# Patient Record
Sex: FEMALE | Race: WHITE | Employment: OTHER | URBAN - NONMETROPOLITAN AREA
[De-identification: names, ages, dates, MRNs, and addresses within clinical notes are randomized per-mention and may not be internally consistent; named-entity substitution may affect disease eponyms.]

---

## 2017-03-03 ENCOUNTER — HOSPITAL ENCOUNTER (OUTPATIENT)
Dept: GENERAL RADIOLOGY | Age: 72
Discharge: HOME | End: 2017-03-03
Payer: MEDICARE

## 2017-03-03 DIAGNOSIS — M25.542 ARTHRALGIA OF LEFT HAND: ICD-10-CM

## 2017-03-03 PROCEDURE — 73130 X-RAY EXAM OF HAND: CPT

## 2017-03-15 ENCOUNTER — HOSPITAL ENCOUNTER (OUTPATIENT)
Dept: NEUROLOGY | Age: 72
Discharge: HOME | End: 2017-03-15
Payer: MEDICARE

## 2017-03-15 PROCEDURE — 95886 MUSC TEST DONE W/N TEST COMP: CPT

## 2017-03-15 PROCEDURE — 95911 NRV CNDJ TEST 9-10 STUDIES: CPT

## 2017-03-15 PROCEDURE — 95886 MUSC TEST DONE W/N TEST COMP: CPT | Performed by: PSYCHIATRY & NEUROLOGY

## 2017-03-15 PROCEDURE — 95911 NRV CNDJ TEST 9-10 STUDIES: CPT | Performed by: PSYCHIATRY & NEUROLOGY

## 2017-04-12 ENCOUNTER — HOSPITAL ENCOUNTER (OUTPATIENT)
Dept: GENERAL RADIOLOGY | Facility: HOSPITAL | Age: 72
Discharge: HOME | End: 2017-04-12
Admitting: ANESTHESIOLOGY

## 2017-04-12 ENCOUNTER — APPOINTMENT (OUTPATIENT)
Dept: PREADMISSION TESTING | Facility: HOSPITAL | Age: 72
End: 2017-04-12

## 2017-04-12 VITALS
DIASTOLIC BLOOD PRESSURE: 65 MMHG | HEART RATE: 67 BPM | OXYGEN SATURATION: 90 % | BODY MASS INDEX: 18.94 KG/M2 | WEIGHT: 125 LBS | HEIGHT: 68 IN | SYSTOLIC BLOOD PRESSURE: 134 MMHG | RESPIRATION RATE: 20 BRPM

## 2017-04-12 LAB
ANION GAP SERPL CALCULATED.3IONS-SCNC: 9 MMOL/L (ref 4–13)
BUN BLD-MCNC: 12 MG/DL (ref 5–21)
BUN/CREAT SERPL: 15 (ref 7–25)
CALCIUM SPEC-SCNC: 9.7 MG/DL (ref 8.4–10.4)
CHLORIDE SERPL-SCNC: 99 MMOL/L (ref 98–110)
CO2 SERPL-SCNC: 35 MMOL/L (ref 24–31)
CREAT BLD-MCNC: 0.8 MG/DL (ref 0.5–1.4)
DEPRECATED RDW RBC AUTO: 46.5 FL (ref 40–54)
ERYTHROCYTE [DISTWIDTH] IN BLOOD BY AUTOMATED COUNT: 12.6 % (ref 12–15)
GFR SERPL CREATININE-BSD FRML MDRD: 71 ML/MIN/1.73
GLUCOSE BLD-MCNC: 78 MG/DL (ref 70–100)
HCT VFR BLD AUTO: 49.7 % (ref 37–47)
HGB BLD-MCNC: 16.3 G/DL (ref 12–16)
MCH RBC QN AUTO: 33.1 PG (ref 28–32)
MCHC RBC AUTO-ENTMCNC: 32.8 G/DL (ref 33–36)
MCV RBC AUTO: 101 FL (ref 82–98)
PLATELET # BLD AUTO: 137 10*3/MM3 (ref 130–400)
PMV BLD AUTO: 11.8 FL (ref 6–12)
POTASSIUM BLD-SCNC: 3.9 MMOL/L (ref 3.5–5.3)
RBC # BLD AUTO: 4.92 10*6/MM3 (ref 4.2–5.4)
SODIUM BLD-SCNC: 143 MMOL/L (ref 135–145)
WBC NRBC COR # BLD: 5.96 10*3/MM3 (ref 4.8–10.8)

## 2017-04-12 PROCEDURE — 93010 ELECTROCARDIOGRAM REPORT: CPT | Performed by: INTERNAL MEDICINE

## 2017-04-12 PROCEDURE — 85027 COMPLETE CBC AUTOMATED: CPT

## 2017-04-12 PROCEDURE — 71010 HC CHEST PA OR AP: CPT

## 2017-04-12 PROCEDURE — 93005 ELECTROCARDIOGRAM TRACING: CPT

## 2017-04-12 PROCEDURE — 80048 BASIC METABOLIC PNL TOTAL CA: CPT

## 2017-04-12 RX ORDER — CLORAZEPATE DIPOTASSIUM 7.5 MG/1
7.5 TABLET ORAL 2 TIMES DAILY PRN
COMMUNITY

## 2017-04-12 RX ORDER — LISINOPRIL 5 MG/1
5 TABLET ORAL DAILY
COMMUNITY

## 2017-04-12 RX ORDER — LEVALBUTEROL INHALATION SOLUTION 1.25 MG/3ML
1 SOLUTION RESPIRATORY (INHALATION) 2 TIMES DAILY
COMMUNITY
End: 2019-06-01 | Stop reason: SDUPTHER

## 2017-04-12 RX ORDER — LEVOTHYROXINE SODIUM 0.1 MG/1
100 TABLET ORAL DAILY
COMMUNITY

## 2017-04-12 RX ORDER — LEVOCETIRIZINE DIHYDROCHLORIDE 5 MG/1
5 TABLET, FILM COATED ORAL EVERY EVENING
COMMUNITY

## 2017-04-12 RX ORDER — MONTELUKAST SODIUM 10 MG/1
10 TABLET ORAL NIGHTLY
COMMUNITY

## 2017-04-12 RX ORDER — MELATONIN
1000 DAILY
COMMUNITY

## 2017-04-12 RX ORDER — VERAPAMIL HYDROCHLORIDE 240 MG/1
240 TABLET, FILM COATED, EXTENDED RELEASE ORAL NIGHTLY
COMMUNITY

## 2017-04-12 RX ORDER — AZELASTINE 1 MG/ML
2 SPRAY, METERED NASAL 2 TIMES DAILY PRN
COMMUNITY

## 2017-04-12 NOTE — DISCHARGE INSTRUCTIONS
DAY OF SURGERY INSTRUCTIONS        YOUR SURGEON: dr thomas hermosillo    PROCEDURE: ulnar nerve transposition left     DATE OF SURGERY: 4/19/2017    ARRIVAL TIME: AS DIRECTED BY OFFICE    DAY OF SURGERY TAKE ONLY THESE MEDICATIONS: ***to use inhalers        BEFORE YOU COME TO THE HOSPITAL  (Pre-op instructions)  • Do not eat, drink, smoke or chew gum after midnight the night before surgery.  This also includes no mints.  • Morning of surgery take only the medicines you have been instructed with a sip of water unless otherwise instructed  by your physician.  • Do not shave, wear makeup or dark nail polish.  • Remove all jewelry including rings.  • Leave anything you consider valuable at home.  • Leave your suitcase in the car until after your surgery.  • Bring the following with you if applicable:  o Picture ID and insurance, Medicare or Medicaid cards  o Co-pay/deductible required by insurance (cash, check, credit card)  o Medications (no narcotics) in original bottles (not a list) including all over-the-counter medications.  o Copy of advance directive, living will or power-of- documents if not brought to PAT  o CPAP or BIPAP mask and tubing  o Skin prep instruction sheet  o Relaxation aids (MP3 player, book, magazine)  • Confirm your arrival time with you surgeon the day before your surgery (surgery times are subject to change)  • On the day of surgery check in at registration located at the main entrance of the hospital.       Outpatient Surgery Guidelines, Adult  Outpatient procedures are those for which the person having the procedure is allowed to go home the same day as the procedure. Various procedures are done on an outpatient basis. You should follow some general guidelines if you will be having an outpatient procedure.  LET YOUR HEALTH CARE PROVIDER KNOW ABOUT:  · Any allergies you have.  · All medicines you are taking, including vitamins, herbs, eye drops, creams, and over-the-counter  medicines.  · Previous problems you or members of your family have had with the use of anesthetics.  · Any blood disorders you have.  · Previous surgeries you have had.  · Medical conditions you have.  RISKS AND COMPLICATIONS  Your health care provider will discuss possible risks and complications with you before surgery. Common risks and complications include:    · Problems due to the use of anesthetics.  · Blood loss and replacement (does not apply to minor surgical procedures).  · Temporary increase in pain due to surgery.  · Uncorrected pain or problems that the surgery was meant to correct.  · Infection.  · New damage.  BEFORE THE PROCEDURE  · Ask your health care provider about changing or stopping your regular medicines. You may need to stop taking certain medicines in the days or weeks before the procedure.  · Stop smoking at least 2 weeks before surgery. This lowers your risk for complications during and after surgery. Ask your health care provider for help with this if needed.  · Eat your usual meals and a light supper the day before surgery. Continue fluid intake. Do not drink alcohol.  · Do not eat or drink after midnight the night before your surgery.   · Arrange for someone to take you home and to stay with you for 24 hours after the procedure. Medicine given for your procedure may affect your ability to drive or to care for yourself.  · Call your health care provider's office if you develop an illness or problem that may prevent you from safely having your procedure.  AFTER THE PROCEDURE  After surgery, you will be taken to a recovery area, where your progress will be monitored. If there are no complications, you will be allowed to go home when you are awake, stable, and taking fluids well. You may have numbness around the surgical site. Healing will take some time. You will have tenderness at the surgical site and may have some swelling and bruising. You may also have some nausea.  HOME CARE  INSTRUCTIONS  · Do not drive for 24 hours, or as directed by your health care provider. Do not drive while taking prescription pain medicines.  · Do not drink alcohol for 24 hours.  · Do not make important decisions or sign legal documents for 24 hours.  · You may resume a normal diet and activities as directed.  · Do not lift anything heavier than 10 pounds (4.5 kg) or play contact sports until your health care provider says it is okay.  · Change your bandages (dressings) as directed.  · Only take over-the-counter or prescription medicines as directed by your health care provider.  · Follow up with your health care provider as directed.  SEEK MEDICAL CARE IF:  · You have increased bleeding (more than a small spot) from the surgical site.  · You have redness, swelling, or increasing pain in the wound.  · You see pus coming from the wound.  · You have a fever.  · You notice a bad smell coming from the wound or dressing.  · You feel lightheaded or faint.  · You develop a rash.  · You have trouble breathing.  · You develop allergies.  MAKE SURE YOU:  · Understand these instructions.  · Will watch your condition.  · Will get help right away if you are not doing well or get worse.     This information is not intended to replace advice given to you by your health care provider. Make sure you discuss any questions you have with your health care provider.     Document Released: 09/12/2002 Document Revised: 05/03/2016 Document Reviewed: 05/22/2014  [employer] Interactive Patient Education ©2016 [employer]       Fall Prevention in Hospitals, Adult  As a hospital patient, your condition and the treatments you receive can increase your risk for falls. Some additional risk factors for falls in a hospital include:  · Being in an unfamiliar environment.  · Being on bed rest.  · Your surgery.  · Taking certain medicines.  · Your tubing requirements, such as intravenous (IV) therapy or catheters.  It is important that you learn how  to decrease fall risks while at the hospital. Below are important tips that can help prevent falls.  SAFETY TIPS FOR PREVENTING FALLS  Talk about your risk of falling.  · Ask your health care provider why you are at risk for falling. Is it your medicine, illness, tubing placement, or something else?  · Make a plan with your health care provider to keep you safe from falls.  · Ask your health care provider or pharmacist about side effects of your medicines. Some medicines can make you dizzy or affect your coordination.  Ask for help.  · Ask for help before getting out of bed. You may need to press your call button.  · Ask for assistance in getting safely to the toilet.  · Ask for a walker or cane to be put at your bedside. Ask that most of the side rails on your bed be placed up before your health care provider leaves the room.  · Ask family or friends to sit with you.  · Ask for things that are out of your reach, such as your glasses, hearing aids, telephone, bedside table, or call button.  Follow these tips to avoid falling:  · Stay lying or seated, rather than standing, while waiting for help.  · Wear rubber-soled slippers or shoes whenever you walk in the hospital.  · Avoid quick, sudden movements.  ¨ Change positions slowly.  ¨ Sit on the side of your bed before standing.  ¨ Stand up slowly and wait before you start to walk.  · Let your health care provider know if there is a spill on the floor.  · Pay careful attention to the medical equipment, electrical cords, and tubes around you.  · When you need help, use your call button by your bed or in the bathroom. Wait for one of your health care providers to help you.  · If you feel dizzy or unsure of your footing, return to bed and wait for assistance.  · Avoid being distracted by the TV, telephone, or another person in your room.  · Do not lean or support yourself on rolling objects, such as IV poles or bedside tables.     This information is not intended to  replace advice given to you by your health care provider. Make sure you discuss any questions you have with your health care provider.     Document Released: 12/15/2001 Document Revised: 01/08/2016 Document Reviewed: 08/25/2013  [employer] Interactive Patient Education ©2016 [employer]       Surgical Site Infections FAQs  What is a Surgical Site Infection (SSI)?  A surgical site infection is an infection that occurs after surgery in the part of the body where the surgery took place. Most patients who have surgery do not develop an infection. However, infections develop in about 1 to 3 out of every 100 patients who have surgery.  Some of the common symptoms of a surgical site infection are:  · Redness and pain around the area where you had surgery  · Drainage of cloudy fluid from your surgical wound  · Fever  Can SSIs be treated?  Yes. Most surgical site infections can be treated with antibiotics. The antibiotic given to you depends on the bacteria (germs) causing the infection. Sometimes patients with SSIs also need another surgery to treat the infection.  What are some of the things that hospitals are doing to prevent SSIs?  To prevent SSIs, doctors, nurses, and other healthcare providers:  · Clean their hands and arms up to their elbows with an antiseptic agent just before the surgery.  · Clean their hands with soap and water or an alcohol-based hand rub before and after caring for each patient.  · May remove some of your hair immediately before your surgery using electric clippers if the hair is in the same area where the procedure will occur. They should not shave you with a razor.  · Wear special hair covers, masks, gowns, and gloves during surgery to keep the surgery area clean.  · Give you antibiotics before your surgery starts. In most cases, you should get antibiotics within 60 minutes before the surgery starts and the antibiotics should be stopped within 24 hours after surgery.  · Clean the skin at the  site of your surgery with a special soap that kills germs.  What can I do to help prevent SSIs?  Before your surgery:  · Tell your doctor about other medical problems you may have. Health problems such as allergies, diabetes, and obesity could affect your surgery and your treatment.  · Quit smoking. Patients who smoke get more infections. Talk to your doctor about how you can quit before your surgery.  · Do not shave near where you will have surgery. Shaving with a razor can irritate your skin and make it easier to develop an infection.  At the time of your surgery:  · Speak up if someone tries to shave you with a razor before surgery. Ask why you need to be shaved and talk with your surgeon if you have any concerns.  · Ask if you will get antibiotics before surgery.  After your surgery:  · Make sure that your healthcare providers clean their hands before examining you, either with soap and water or an alcohol-based hand rub.  · If you do not see your providers clean their hands, please ask them to do so.  · Family and friends who visit you should not touch the surgical wound or dressings.  · Family and friends should clean their hands with soap and water or an alcohol-based hand rub before and after visiting you. If you do not see them clean their hands, ask them to clean their hands.  What do I need to do when I go home from the hospital?  · Before you go home, your doctor or nurse should explain everything you need to know about taking care of your wound. Make sure you understand how to care for your wound before you leave the hospital.  · Always clean your hands before and after caring for your wound.  · Before you go home, make sure you know who to contact if you have questions or problems after you get home.  · If you have any symptoms of an infection, such as redness and pain at the surgery site, drainage, or fever, call your doctor immediately.  If you have additional questions, please ask your doctor or  nurse.  Developed and co-sponsored by The Society for Healthcare Epidemiology of [place] (SHEA); Infectious Diseases Society [place] (IDSA); American Hospital Association; Association for Professionals in Infection Control and Epidemiology (APIC); Centers for Disease Control and Prevention (CDC); and The Joint Commission.     This information is not intended to replace advice given to you by your health care provider. Make sure you discuss any questions you have with your health care provider.     Document Released: 12/23/2014 Document Revised: 01/08/2016 Document Reviewed: 03/02/2016  [employer] Interactive Patient Education ©2016 [employer]       [facility]  CHG 4% Patient Instruction Sheet    Preparing the Skin Before Surgery  Preparing or “prepping” skin before surgery can reduce the risk of infection at the surgical site. To make the process easier,[facility] has chosen 4% Chlorhexidine Gluconate (CHG) antiseptic solution.   The steps below outline the prepping process and should be carefully followed.                                                                                                                                                      Prep the skin at the following time(s):                                                      We recommend you shower the night before surgery, and again the morning of surgery with the 4% CHG antiseptic solution using half of the bottle and a cloth each time.  Dress in clean clothes/sleepwear after showering.  See instructions below for application.          Do not apply any lotions or moisturizers.       Do not shave the area to be prepped for at least 2 days prior to surgery.    Clipping the hair may be done immediately prior to your surgery at the hospital    if needed.    Directions:  Thoroughly rinse your body with water.  Apply 4% CHG to a cloth and wash skin gently, paying special attention to the operative site.  Rinse again thoroughly.  Once you  have begun using this product do not apply anything else to your skin. If itching or redness persists, rinse affected areas and discontinue use.    When using this product:  • Keep out of eyes, ears, and mouth.  • If solution should contact these areas, rinse out promptly and thoroughly with water.  • For external use only.  • Do not use in genital area, on your face or head.      PATIENT/FAMILY/RESPONSIBLE PARTY VERBALIZES UNDERSTANDING OF ABOVE EDUCATION

## 2017-04-19 ENCOUNTER — ANESTHESIA EVENT (OUTPATIENT)
Dept: PERIOP | Facility: HOSPITAL | Age: 72
End: 2017-04-19

## 2017-04-19 ENCOUNTER — HOSPITAL ENCOUNTER (OUTPATIENT)
Facility: HOSPITAL | Age: 72
Setting detail: HOSPITAL OUTPATIENT SURGERY
Discharge: HOME | End: 2017-04-19
Attending: ORTHOPAEDIC SURGERY | Admitting: ORTHOPAEDIC SURGERY

## 2017-04-19 ENCOUNTER — ANESTHESIA (OUTPATIENT)
Dept: PERIOP | Facility: HOSPITAL | Age: 72
End: 2017-04-19

## 2017-04-19 VITALS
HEART RATE: 59 BPM | OXYGEN SATURATION: 94 % | TEMPERATURE: 98.5 F | SYSTOLIC BLOOD PRESSURE: 93 MMHG | RESPIRATION RATE: 16 BRPM | DIASTOLIC BLOOD PRESSURE: 73 MMHG

## 2017-04-19 PROCEDURE — 25010000002 ONDANSETRON PER 1 MG: Performed by: ANESTHESIOLOGY

## 2017-04-19 RX ORDER — SODIUM CHLORIDE 0.9 % (FLUSH) 0.9 %
1-10 SYRINGE (ML) INJECTION AS NEEDED
Status: DISCONTINUED | OUTPATIENT
Start: 2017-04-19 | End: 2017-04-19 | Stop reason: HOSPADM

## 2017-04-19 RX ORDER — METOCLOPRAMIDE HYDROCHLORIDE 5 MG/ML
5 INJECTION INTRAMUSCULAR; INTRAVENOUS
Status: DISCONTINUED | OUTPATIENT
Start: 2017-04-19 | End: 2017-04-19 | Stop reason: HOSPADM

## 2017-04-19 RX ORDER — SODIUM CHLORIDE, SODIUM LACTATE, POTASSIUM CHLORIDE, CALCIUM CHLORIDE 600; 310; 30; 20 MG/100ML; MG/100ML; MG/100ML; MG/100ML
100 INJECTION, SOLUTION INTRAVENOUS CONTINUOUS
Status: DISCONTINUED | OUTPATIENT
Start: 2017-04-19 | End: 2017-04-19 | Stop reason: HOSPADM

## 2017-04-19 RX ORDER — IPRATROPIUM BROMIDE AND ALBUTEROL SULFATE 2.5; .5 MG/3ML; MG/3ML
3 SOLUTION RESPIRATORY (INHALATION) ONCE AS NEEDED
Status: DISCONTINUED | OUTPATIENT
Start: 2017-04-19 | End: 2017-04-19 | Stop reason: HOSPADM

## 2017-04-19 RX ORDER — FLUMAZENIL 0.1 MG/ML
0.2 INJECTION INTRAVENOUS AS NEEDED
Status: DISCONTINUED | OUTPATIENT
Start: 2017-04-19 | End: 2017-04-19 | Stop reason: HOSPADM

## 2017-04-19 RX ORDER — LABETALOL HYDROCHLORIDE 5 MG/ML
5 INJECTION, SOLUTION INTRAVENOUS
Status: DISCONTINUED | OUTPATIENT
Start: 2017-04-19 | End: 2017-04-19 | Stop reason: HOSPADM

## 2017-04-19 RX ORDER — ONDANSETRON 2 MG/ML
4 INJECTION INTRAMUSCULAR; INTRAVENOUS AS NEEDED
Status: DISCONTINUED | OUTPATIENT
Start: 2017-04-19 | End: 2017-04-19 | Stop reason: HOSPADM

## 2017-04-19 RX ORDER — IPRATROPIUM BROMIDE AND ALBUTEROL SULFATE 2.5; .5 MG/3ML; MG/3ML
3 SOLUTION RESPIRATORY (INHALATION) DAILY
Status: COMPLETED | OUTPATIENT
Start: 2017-04-19 | End: 2017-04-19

## 2017-04-19 RX ORDER — MIDAZOLAM HYDROCHLORIDE 1 MG/ML
2 INJECTION INTRAMUSCULAR; INTRAVENOUS
Status: DISCONTINUED | OUTPATIENT
Start: 2017-04-19 | End: 2017-04-19 | Stop reason: HOSPADM

## 2017-04-19 RX ORDER — MORPHINE SULFATE 2 MG/ML
2 INJECTION, SOLUTION INTRAMUSCULAR; INTRAVENOUS AS NEEDED
Status: DISCONTINUED | OUTPATIENT
Start: 2017-04-19 | End: 2017-04-19 | Stop reason: HOSPADM

## 2017-04-19 RX ORDER — PHENYLEPHRINE HCL IN 0.9% NACL 0.8MG/10ML
SYRINGE (ML) INTRAVENOUS AS NEEDED
Status: DISCONTINUED | OUTPATIENT
Start: 2017-04-19 | End: 2017-04-19 | Stop reason: SURG

## 2017-04-19 RX ORDER — NALOXONE HCL 0.4 MG/ML
0.04 VIAL (ML) INJECTION AS NEEDED
Status: DISCONTINUED | OUTPATIENT
Start: 2017-04-19 | End: 2017-04-19 | Stop reason: HOSPADM

## 2017-04-19 RX ORDER — SUFENTANIL CITRATE 50 UG/ML
INJECTION EPIDURAL; INTRAVENOUS AS NEEDED
Status: DISCONTINUED | OUTPATIENT
Start: 2017-04-19 | End: 2017-04-19 | Stop reason: SURG

## 2017-04-19 RX ORDER — MIDAZOLAM HYDROCHLORIDE 1 MG/ML
1 INJECTION INTRAMUSCULAR; INTRAVENOUS
Status: DISCONTINUED | OUTPATIENT
Start: 2017-04-19 | End: 2017-04-19 | Stop reason: HOSPADM

## 2017-04-19 RX ORDER — OXYCODONE AND ACETAMINOPHEN 7.5; 325 MG/1; MG/1
1 TABLET ORAL EVERY 6 HOURS PRN
Qty: 40 TABLET | Refills: 0
Start: 2017-04-19 | End: 2017-04-29

## 2017-04-19 RX ORDER — HYDRALAZINE HYDROCHLORIDE 20 MG/ML
5 INJECTION INTRAMUSCULAR; INTRAVENOUS
Status: DISCONTINUED | OUTPATIENT
Start: 2017-04-19 | End: 2017-04-19 | Stop reason: HOSPADM

## 2017-04-19 RX ORDER — MAGNESIUM HYDROXIDE 1200 MG/15ML
LIQUID ORAL AS NEEDED
Status: DISCONTINUED | OUTPATIENT
Start: 2017-04-19 | End: 2017-04-19 | Stop reason: HOSPADM

## 2017-04-19 RX ORDER — MEPERIDINE HYDROCHLORIDE 25 MG/ML
12.5 INJECTION INTRAMUSCULAR; INTRAVENOUS; SUBCUTANEOUS
Status: DISCONTINUED | OUTPATIENT
Start: 2017-04-19 | End: 2017-04-19 | Stop reason: HOSPADM

## 2017-04-19 RX ADMIN — SODIUM CHLORIDE, POTASSIUM CHLORIDE, SODIUM LACTATE AND CALCIUM CHLORIDE 100 ML/HR: 600; 310; 30; 20 INJECTION, SOLUTION INTRAVENOUS at 06:39

## 2017-04-19 RX ADMIN — IPRATROPIUM BROMIDE AND ALBUTEROL SULFATE 3 ML: .5; 3 SOLUTION RESPIRATORY (INHALATION) at 07:34

## 2017-04-19 RX ADMIN — LIDOCAINE HYDROCHLORIDE 0.5 ML: 10 INJECTION, SOLUTION EPIDURAL; INFILTRATION; INTRACAUDAL; PERINEURAL at 06:39

## 2017-04-19 RX ADMIN — SUFENTANIL CITRATE 20 MCG: 50 INJECTION, SOLUTION EPIDURAL; INTRAVENOUS at 07:54

## 2017-04-19 RX ADMIN — Medication 800 MCG: at 08:00

## 2017-04-19 RX ADMIN — ONDANSETRON 4 MG: 2 INJECTION INTRAMUSCULAR; INTRAVENOUS at 12:39

## 2017-04-19 NOTE — OP NOTE
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     PROCEDURE PERFORMED: Anterior transposition of the left ulnar nerve.     SURGEON:  Narendra Timmons MD     INDICATIONS FOR PROCEDURE: This patient has developed a severe entrapment of the ulnar nerve with significant intrinsic atrophy of the musculature of her hand. It was felt that an anterior transposition of ulnar nerve was indicated. The patient understands the risk of incomplete return of function, due to the severe nature of her atrophy. She accepts that risk and asked me to proceed.     DESCRIPTION OF PROCEDURE: After an adequate level of general anesthesia, the patient's left upper extremity was prepped and draped in the usual fashion. The extremity was then exsanguinated with an Esmarch bandage and the tourniquet was inflated to 250 mmHg.  A curvilinear incision was then made over the prominence of the medial epicondyle. Using meticulous blunt dissection, the ulnar nerve was then identified proximally and then dissected free from the cubital tunnel. Any bleeders were controlled with the bipolar Bovie. When the nerve was sufficiently released proximally and distally, attention was placed to the medial intermuscular septum. This was released off the medial epicondyle down to the medial aspect of the humerus. A portion of the conjoined tendon was then released off of the medial epicondyle distal to the insertion of the medial epicondyle to create an appropriate bed for the nerve to lie in unrestricted. The area was then irrigated and suctioned dry. The cubital tunnel was then closed with Vicryl suture. The subcutaneous tissue was then sewn to the medial epicondyle so as to restrict of the ulnar nerve from subluxing. Care was taken not to put any undue tension on the repair. The skin was then closed with  nylon suture. A dressing was then applied. The patient tolerated the procedure well and is to follow up in the office.         cc:             WAQAS WRIGHT/[id number]  D:  04/19/2017 10:25:00(Eastern Time)  T:  04/19/2017 12:16:32(Eastern Time)  Voice ID:  [id number]/Document ID:  [id number]

## 2017-04-19 NOTE — ANESTHESIA PROCEDURE NOTES
Airway  Urgency: elective    Date/Time: 4/19/2017 7:47 AM  Airway not difficult    General Information and Staff    Patient location during procedure: OR  CRNA: EVE HER    Indications and Patient Condition  Indications for airway management: airway protection    Preoxygenated: yes  MILS maintained throughout  Mask difficulty assessment: 1 - vent by mask    Final Airway Details  Final airway type: supraglottic airway      Successful airway: Ley[place]  Size 3  Airway Seal Pressure (cm H2O): 15    Number of attempts at approach: 1

## 2017-04-19 NOTE — NURSING NOTE
Percocet order was put in [facility] by Dr Timmons but did not print. Nurse in PACU (Siomara) notified Dr Timmons that it would not print. Dr Timmons hand wrote a script for Norco for patient to go home with. Called and spoke with Siomara in PACU to verify correct medication for patient to go home with

## 2017-04-19 NOTE — DISCHARGE INSTRUCTIONS

## 2017-04-19 NOTE — PLAN OF CARE
Problem: Patient Care Overview (Adult)  Goal: Plan of Care Review    04/19/17 1444   Coping/Psychosocial Response Interventions   Plan Of Care Reviewed With significant other   Patient Care Overview   Progress improving   Outcome Evaluation   Outcome Summary/Follow up Plan pt unable to keep sats above 90% with O2. able to go home with baseline sats of 85% or above per md orders. doing well non symptomatic          Problem: Perioperative Period (Adult)  Goal: Signs and Symptoms of Listed Potential Problems Will be Absent or Manageable (Perioperative Period)  Outcome: Outcome(s) achieved Date Met:  04/19/17 04/19/17 1440   Perioperative Period   Problems Assessed (Perioperative Period) all;pain;other (see comments)  (baseline is 85% went home at 94%)   Problems Present (Perioperative Period) none;pain;other (see comments)  (pt 02 sats low and is to go home with 02 with 85% sats per )

## 2017-04-19 NOTE — ANESTHESIA PREPROCEDURE EVALUATION
" Anesthesia Evaluation     NPO Status: > 8 hours   Airway   Mallampati: II  TM distance: >3 FB  Neck ROM: full  no difficulty expected  Dental    (+) implants and lower dentures    Pulmonary     breath sounds clear to auscultation  (+) COPD severe, asthma, sleep apnea, decreased breath sounds,     ROS comment: On home O2  Cardiovascular - normal exam  Exercise tolerance: poor (<4 METS)    Rhythm: regular  Rate: normal    (+) hypertension well controlled,       Neuro/Psych  (+) psychiatric history Anxiety,    GI/Hepatic/Renal/Endo - negative ROS     Musculoskeletal     Abdominal  - normal exam    Abdomen: soft.   Substance History - negative use     OB/GYN negative ob/gyn ROS         Other   (+) arthritis       Other Comment: States that she gets \"real mean and quits breathing when administered N2O in the dentist's office.                            Anesthesia Plan    ASA 3     general     intravenous induction   Anesthetic plan and risks discussed with patient.    Plan discussed with CRNA.      "

## 2017-04-19 NOTE — ANESTHESIA POSTPROCEDURE EVALUATION
Patient: Julya Jose    Procedure Summary     Date Anesthesia Start Anesthesia Stop Room / Location    04/19/17 0754 0917  [facility] OR 11 / [facility] OR       Procedure Diagnosis Surgeon Provider    ULNAR NERVE TRANSPOSITION, LEFT (Left Elbow) (G56.22, LEFT) MD Pepe Landa CRNA          Anesthesia Type: general  Last vitals  /67 (04/19/17 1010)    Temp      Pulse 55 (04/19/17 1010)   Resp 11 (04/19/17 1010)    SpO2 91 % (04/19/17 1010)      Post Anesthesia Care and Evaluation    Patient location during evaluation: PACU  Patient participation: complete - patient participated  Level of consciousness: awake and alert  Pain management: adequate  Airway patency: patent  Anesthetic complications: No anesthetic complications    Cardiovascular status: acceptable and hemodynamically stable  Respiratory status: acceptable  Hydration status: acceptable    Comments: )Pt with COPD on home o2

## 2017-06-12 RX ORDER — LEVOTHYROXINE SODIUM 0.1 MG/1
100 TABLET ORAL DAILY
Qty: 30 TABLET | Refills: 3 | Status: SHIPPED | OUTPATIENT
Start: 2017-06-12 | End: 2017-06-16 | Stop reason: SDUPTHER

## 2017-06-16 RX ORDER — LEVOTHYROXINE SODIUM 0.1 MG/1
TABLET ORAL
Qty: 30 TABLET | Refills: 2 | OUTPATIENT
Start: 2017-06-16

## 2017-06-16 RX ORDER — LEVOTHYROXINE SODIUM 0.1 MG/1
100 TABLET ORAL DAILY
Qty: 30 TABLET | Refills: 3 | Status: SHIPPED | OUTPATIENT
Start: 2017-06-16 | End: 2017-07-12 | Stop reason: SDUPTHER

## 2017-07-06 RX ORDER — MONTELUKAST SODIUM 10 MG/1
TABLET ORAL
Qty: 90 TABLET | Refills: 2 | Status: SHIPPED | OUTPATIENT
Start: 2017-07-06 | End: 2017-10-30 | Stop reason: SDUPTHER

## 2017-07-11 ENCOUNTER — TELEPHONE (OUTPATIENT)
Dept: INTERNAL MEDICINE | Age: 72
End: 2017-07-11

## 2017-07-12 RX ORDER — LEVOTHYROXINE SODIUM 0.1 MG/1
100 TABLET ORAL DAILY
Qty: 90 TABLET | Refills: 3 | Status: SHIPPED | OUTPATIENT
Start: 2017-07-12 | End: 2017-10-30 | Stop reason: SDUPTHER

## 2017-09-01 RX ORDER — LEVOCETIRIZINE DIHYDROCHLORIDE 5 MG/1
TABLET, FILM COATED ORAL
Qty: 90 TABLET | Refills: 2 | Status: SHIPPED | OUTPATIENT
Start: 2017-09-01 | End: 2017-10-30 | Stop reason: SDUPTHER

## 2017-09-08 DIAGNOSIS — Z01.89 ROUTINE LAB DRAW: ICD-10-CM

## 2017-10-02 RX ORDER — VERAPAMIL HYDROCHLORIDE 240 MG/1
TABLET, FILM COATED, EXTENDED RELEASE ORAL
Qty: 90 TABLET | Refills: 3 | Status: SHIPPED | OUTPATIENT
Start: 2017-10-02 | End: 2017-10-30 | Stop reason: SDUPTHER

## 2017-10-13 RX ORDER — CLORAZEPATE DIPOTASSIUM 7.5 MG/1
TABLET ORAL
Qty: 60 TABLET | Refills: 0 | Status: SHIPPED | OUTPATIENT
Start: 2017-10-13 | End: 2017-10-30 | Stop reason: SDUPTHER

## 2017-10-24 ENCOUNTER — TELEPHONE (OUTPATIENT)
Dept: INTERNAL MEDICINE | Age: 72
End: 2017-10-24

## 2017-10-24 NOTE — TELEPHONE ENCOUNTER
Patient called and left  on 10/23/2017 at 11:22am  She gave her name, birthday and phone number but by the time she got all of that out, she said   \"oh hell forget it\"    Unknown reason for her call. Called patient back today to see what she needed. Shes going to get her labs done at [address] [facility] these orders this date.    Fax [phone]

## 2017-10-30 ENCOUNTER — OFFICE VISIT (OUTPATIENT)
Dept: INTERNAL MEDICINE | Age: 72
End: 2017-10-30
Payer: MEDICARE

## 2017-10-30 VITALS
HEIGHT: 68 IN | SYSTOLIC BLOOD PRESSURE: 134 MMHG | BODY MASS INDEX: 18.79 KG/M2 | DIASTOLIC BLOOD PRESSURE: 74 MMHG | WEIGHT: 124 LBS | HEART RATE: 67 BPM | OXYGEN SATURATION: 84 %

## 2017-10-30 DIAGNOSIS — E03.9 HYPOTHYROIDISM, UNSPECIFIED TYPE: ICD-10-CM

## 2017-10-30 DIAGNOSIS — F41.9 ANXIETY: ICD-10-CM

## 2017-10-30 DIAGNOSIS — I10 HYPERTENSION, UNSPECIFIED TYPE: Primary | ICD-10-CM

## 2017-10-30 DIAGNOSIS — Z91.09 ENVIRONMENTAL ALLERGIES: ICD-10-CM

## 2017-10-30 DIAGNOSIS — J44.9 CHRONIC OBSTRUCTIVE PULMONARY DISEASE, UNSPECIFIED COPD TYPE (HCC): ICD-10-CM

## 2017-10-30 PROCEDURE — 4040F PNEUMOC VAC/ADMIN/RCVD: CPT | Performed by: INTERNAL MEDICINE

## 2017-10-30 PROCEDURE — 1090F PRES/ABSN URINE INCON ASSESS: CPT | Performed by: INTERNAL MEDICINE

## 2017-10-30 PROCEDURE — 3017F COLORECTAL CA SCREEN DOC REV: CPT | Performed by: INTERNAL MEDICINE

## 2017-10-30 PROCEDURE — 4004F PT TOBACCO SCREEN RCVD TLK: CPT | Performed by: INTERNAL MEDICINE

## 2017-10-30 PROCEDURE — G8427 DOCREV CUR MEDS BY ELIG CLIN: HCPCS | Performed by: INTERNAL MEDICINE

## 2017-10-30 PROCEDURE — 99214 OFFICE O/P EST MOD 30 MIN: CPT | Performed by: INTERNAL MEDICINE

## 2017-10-30 PROCEDURE — 3023F SPIROM DOC REV: CPT | Performed by: INTERNAL MEDICINE

## 2017-10-30 PROCEDURE — G8484 FLU IMMUNIZE NO ADMIN: HCPCS | Performed by: INTERNAL MEDICINE

## 2017-10-30 PROCEDURE — 3014F SCREEN MAMMO DOC REV: CPT | Performed by: INTERNAL MEDICINE

## 2017-10-30 PROCEDURE — G8400 PT W/DXA NO RESULTS DOC: HCPCS | Performed by: INTERNAL MEDICINE

## 2017-10-30 PROCEDURE — G8926 SPIRO NO PERF OR DOC: HCPCS | Performed by: INTERNAL MEDICINE

## 2017-10-30 PROCEDURE — G8420 CALC BMI NORM PARAMETERS: HCPCS | Performed by: INTERNAL MEDICINE

## 2017-10-30 PROCEDURE — 1123F ACP DISCUSS/DSCN MKR DOCD: CPT | Performed by: INTERNAL MEDICINE

## 2017-10-30 RX ORDER — NEOMYCIN SULFATE, POLYMYXIN B SULFATE, HYDROCORTISONE 3.5; 10000; 1 MG/ML; [USP'U]/ML; MG/ML
1 SOLUTION/ DROPS AURICULAR (OTIC) EVERY 8 HOURS SCHEDULED
Qty: 10 ML | Refills: 1 | Status: SHIPPED | OUTPATIENT
Start: 2017-10-30

## 2017-10-30 RX ORDER — CLORAZEPATE DIPOTASSIUM 7.5 MG/1
TABLET ORAL
Qty: 60 TABLET | Refills: 0 | Status: SHIPPED | OUTPATIENT
Start: 2017-10-30 | End: 2017-11-04 | Stop reason: SDUPTHER

## 2017-10-30 RX ORDER — LEVOTHYROXINE SODIUM 0.1 MG/1
100 TABLET ORAL DAILY
Qty: 90 TABLET | Refills: 3 | Status: SHIPPED | OUTPATIENT
Start: 2017-10-30

## 2017-10-30 RX ORDER — BUSPIRONE HYDROCHLORIDE 5 MG/1
5 TABLET ORAL 3 TIMES DAILY PRN
Qty: 30 TABLET | Refills: 0 | Status: ON HOLD | OUTPATIENT
Start: 2017-10-30 | End: 2017-12-19

## 2017-10-30 RX ORDER — LISINOPRIL 5 MG/1
TABLET ORAL
Qty: 90 TABLET | Refills: 3 | Status: SHIPPED | OUTPATIENT
Start: 2017-10-30

## 2017-10-30 RX ORDER — MONTELUKAST SODIUM 10 MG/1
TABLET ORAL
Qty: 90 TABLET | Refills: 2 | Status: SHIPPED | OUTPATIENT
Start: 2017-10-30

## 2017-10-30 RX ORDER — VERAPAMIL HYDROCHLORIDE 240 MG/1
TABLET, FILM COATED, EXTENDED RELEASE ORAL
Qty: 90 TABLET | Refills: 3 | Status: SHIPPED | OUTPATIENT
Start: 2017-10-30

## 2017-10-30 RX ORDER — LEVOCETIRIZINE DIHYDROCHLORIDE 5 MG/1
5 TABLET, FILM COATED ORAL NIGHTLY
Qty: 90 TABLET | Refills: 2 | Status: ON HOLD | OUTPATIENT
Start: 2017-10-30 | End: 2017-12-19

## 2017-10-30 ASSESSMENT — PATIENT HEALTH QUESTIONNAIRE - PHQ9
SUM OF ALL RESPONSES TO PHQ9 QUESTIONS 1 & 2: 0
2. FEELING DOWN, DEPRESSED OR HOPELESS: 0
SUM OF ALL RESPONSES TO PHQ QUESTIONS 1-9: 0
1. LITTLE INTEREST OR PLEASURE IN DOING THINGS: 0

## 2017-10-30 NOTE — PATIENT INSTRUCTIONS
Chronic Obstructive Pulmonary Disease (COPD): Care Instructions  Your Care Instructions    Chronic obstructive pulmonary disease (COPD) is a general term for a group of lung diseases, including emphysema and chronic bronchitis. People with COPD have decreased airflow in and out of the lungs, which makes it hard to breathe. The airways also can get clogged with thick mucus. Cigarette smoking is a major cause of COPD. Although there is no cure for COPD, you can slow its progress. Following your treatment plan and taking care of yourself can help you feel better and live longer. Follow-up care is a key part of your treatment and safety. Be sure to make and go to all appointments, and call your doctor if you are having problems. It's also a good idea to know your test results and keep a list of the medicines you take. How can you care for yourself at home? Staying healthy  · Do not smoke. This is the most important step you can take to prevent more damage to your lungs. If you need help quitting, talk to your doctor about stop-smoking programs and medicines. These can increase your chances of quitting for good. · Avoid colds and flu. Get a pneumococcal vaccine shot. If you have had one before, ask your doctor whether you need a second dose. Get the flu vaccine every fall. If you must be around people with colds or the flu, wash your hands often. · Avoid secondhand smoke, air pollution, and high altitudes. Also avoid cold, dry air and hot, humid air. Stay at home with your windows closed when air pollution is bad. Medicines and oxygen therapy  · Take your medicines exactly as prescribed. Call your doctor if you think you are having a problem with your medicine. · You may be taking medicines such as:  ¨ Bronchodilators. These help open your airways and make breathing easier. Bronchodilators are either short-acting (work for 6 to 9 hours) or long-acting (work for 24 hours).  You inhale most bronchodilators, so they start to act quickly. Always carry your quick-relief inhaler with you in case you need it while you are away from home. ¨ Corticosteroids (prednisone, budesonide). These reduce airway inflammation. They come in pill or inhaled form. You must take these medicines every day for them to work well. · A spacer may help you get more inhaled medicine to your lungs. Ask your doctor or pharmacist if a spacer is right for you. If it is, ask how to use it properly. · Do not take any vitamins, over-the-counter medicine, or herbal products without talking to your doctor first.  · If your doctor prescribed antibiotics, take them as directed. Do not stop taking them just because you feel better. You need to take the full course of antibiotics. · Oxygen therapy boosts the amount of oxygen in your blood and helps you breathe easier. Use the flow rate your doctor has recommended, and do not change it without talking to your doctor first.  Activity  · Get regular exercise. Walking is an easy way to get exercise. Start out slowly, and walk a little more each day. · Pay attention to your breathing. You are exercising too hard if you cannot talk while you are exercising. · Take short rest breaks when doing household chores and other activities. · Learn breathing methods--such as breathing through pursed lips--to help you become less short of breath. · If your doctor has not set you up with a pulmonary rehabilitation program, talk to him or her about whether rehab is right for you. Rehab includes exercise programs, education about your disease and how to manage it, help with diet and other changes, and emotional support. Diet  · Eat regular, healthy meals. Use bronchodilators about 1 hour before you eat to make it easier to eat. Eat several small meals instead of three large ones. Drink beverages at the end of the meal. Avoid foods that are hard to chew.   · Eat foods that contain protein so that you do not lose muscle

## 2017-11-06 RX ORDER — CLORAZEPATE DIPOTASSIUM 7.5 MG/1
TABLET ORAL
Qty: 60 TABLET | Refills: 2 | Status: SHIPPED | OUTPATIENT
Start: 2017-11-06 | End: 2017-11-14 | Stop reason: SDUPTHER

## 2017-11-07 ASSESSMENT — ENCOUNTER SYMPTOMS
BLOOD IN STOOL: 0
SORE THROAT: 0
NAUSEA: 0
EYE PAIN: 0
EYE REDNESS: 0
PHOTOPHOBIA: 0
RECTAL PAIN: 0
EYE DISCHARGE: 0
VOICE CHANGE: 0
EYE ITCHING: 0
TROUBLE SWALLOWING: 0
CONSTIPATION: 0
ABDOMINAL DISTENTION: 0
COLOR CHANGE: 0
RHINORRHEA: 0
BACK PAIN: 0
SINUS PAIN: 0
ABDOMINAL PAIN: 0
DIARRHEA: 0
VOMITING: 0
ANAL BLEEDING: 0
SINUS PRESSURE: 0

## 2017-11-07 NOTE — PROGRESS NOTES
Chief Complaint   Patient presents with    6 Month Follow-Up    Anxiety    Hypothyroidism       HPI: Patricia Julian is a 67 y.o. female is here for Follow-up of hypertension, hypothyroidism and anxiety. Her blood pressure is well controlled. She denies complaints of chest pain, chest pressure or shortness of breath. She occasionally has some chest tightness secondary to history of COPD, but at this time she is not having any issues. She did see Dr. nAne Gilbert recently for her COPD. She states that her breathing varies depending on the weather. She did have some shortness of breath here a few weeks ago when she was painting her house, but this has really resolved. She is on oxygen at night. She does think that the nocturnal O2 does help with her memory. She feels much better since she started oxygen at night. She declines a low-dose CT scan to evaluate for lung cancer. Her TSH is currently stable. She does use clorazepate at night to relax and get some sleep. She would like to try something that is not a narcotic so she will not have to pick the medication up. She would like a prescription for Clorazepate to have on hand, but she would like to try something else for anxiety. She has no major concerns or complaints today. Past Medical History:   Diagnosis Date    Anxiety     Asthma     Hypertension     Hypothyroidism     Irritable bowel syndrome       Past Surgical History:   Procedure Laterality Date    HYSTERECTOMY, VAGINAL      PROCTOPEXY      abdominal approach    SUBTOTAL COLECTOMY        Social History     Social History    Marital status:       Spouse name: N/A    Number of children: N/A    Years of education: N/A     Social History Main Topics    Smoking status: Current Every Day Smoker     Packs/day: 1.00     Types: Cigarettes    Smokeless tobacco: Never Used    Alcohol use No    Drug use: No    Sexual activity: Not Currently     Other Topics Concern    None     Social polyuria. Genitourinary: Negative for difficulty urinating, dysuria, flank pain, frequency and hematuria. Musculoskeletal: Negative for arthralgias, back pain, gait problem, joint swelling, myalgias, neck pain and neck stiffness. Skin: Negative for color change, pallor, rash and wound. Allergic/Immunologic: Positive for environmental allergies. Negative for food allergies and immunocompromised state. Neurological: Negative for dizziness, tremors, seizures, syncope, facial asymmetry, speech difficulty, weakness, light-headedness, numbness and headaches. Hematological: Negative for adenopathy. Does not bruise/bleed easily. Psychiatric/Behavioral: Negative for dysphoric mood. The patient is nervous/anxious. She does have some anxiety issues that time       /74   Pulse 67   Ht 5' 8\" (1.727 m)   Wt 124 lb (56.2 kg)   SpO2 (!) 84%   BMI 18.85 kg/m²   Physical Exam   Constitutional: She is oriented to person, place, and time. She appears well-developed and well-nourished. No distress. HENT:   Head: Normocephalic and atraumatic. Right Ear: External ear normal.   Left Ear: External ear normal.   Nose: Nose normal.   Mouth/Throat: Oropharynx is clear and moist. No oropharyngeal exudate. Eyes: Conjunctivae and EOM are normal. Pupils are equal, round, and reactive to light. Right eye exhibits no discharge. Left eye exhibits no discharge. No scleral icterus. Neck: Normal range of motion. Neck supple. No JVD present. No tracheal deviation present. No thyromegaly present. Cardiovascular: Normal rate, regular rhythm, normal heart sounds and intact distal pulses. Exam reveals no gallop and no friction rub. No murmur heard. Pulmonary/Chest: Effort normal and breath sounds normal. No respiratory distress. She has no wheezes. She has no rales. She exhibits no tenderness. Abdominal: Soft. Bowel sounds are normal. She exhibits no distension and no mass. There is no tenderness.  There is no rebound and no guarding. Musculoskeletal: Normal range of motion. She exhibits no edema, tenderness or deformity. Lymphadenopathy:     She has no cervical adenopathy. Neurological: She is alert and oriented to person, place, and time. She has normal reflexes. She displays normal reflexes. No cranial nerve deficit. She exhibits normal muscle tone. Coordination normal.   Skin: Skin is warm and dry. No rash noted. She is not diaphoretic. No erythema. No pallor. Psychiatric: She has a normal mood and affect. Her behavior is normal. Judgment and thought content normal.   Nursing note and vitals reviewed. 1. Hypertension, unspecified type        ASSESSMENT/PLAN:    20-year-old woman here for follow-up of hypertension    1. Hypertension: Blood pressure well controlled with current regimen    2. Hypothyroidism: Stable on her current dose of levothyroxine    3. Environmental allergies: Continue Singulair    4. COPD stable    5. Anxiety: We can continue clorazepate as needed. She would like to try BuSpar which is not a narcotic to see if she can prevent to have didn't come to [place] so many times to  her control prescription    6. She's not having any issues at this time, she would like a prescription of eardrops to have available should she develop ear pain. Eardrops were prescribed today    Mac Sarah was seen today for 6 month follow-up, anxiety and hypothyroidism. Diagnoses and all orders for this visit:    Hypertension, unspecified type  -     CBC Auto Differential; Future  -     Comprehensive Metabolic Panel; Future  -     Lipid Panel; Future  -     TSH without Reflex; Future    Other orders  -     Discontinue: clorazepate (TRANXENE) 7.5 MG tablet; TAKE 1 TABLET BY MOUTH TWICE A DAY AS NEEDED  -     levocetirizine (XYZAL) 5 MG tablet; Take 1 tablet by mouth nightly  -     levothyroxine (LEVOTHROID) 100 MCG tablet;  Take 1 tablet by mouth daily  -     lisinopril (PRINIVIL;ZESTRIL) 5 MG tablet; TAKE 1 TABLET BY MOUTH EVERY DAY  -     montelukast (SINGULAIR) 10 MG tablet; TAKE 1 TABLET EVERY DAY  -     neomycin-polymyxin-hydrocortisone 1 % SOLN otic solution; Place 1 drop into both ears every 8 hours  -     verapamil (CALAN SR) 240 MG extended release tablet; TAKE 1 TABLET EVERY DAY  -     busPIRone (BUSPAR) 5 MG tablet;  Take 1 tablet by mouth 3 times daily as needed (anxiety)          Return in about 6 months (around 4/30/2018) for physical.     Orders Placed This Encounter   Procedures    CBC Auto Differential     Fast 12 hours     Standing Status:   Future     Standing Expiration Date:   10/30/2018    Comprehensive Metabolic Panel     Fasting 12 hours     Standing Status:   Future     Standing Expiration Date:   10/30/2018    Lipid Panel     Standing Status:   Future     Standing Expiration Date:   10/30/2018     Order Specific Question:   Is Patient Fasting?/# of Hours     Answer:   12    TSH without Reflex     Fast 12 hours     Standing Status:   Future     Standing Expiration Date:   10/30/2018       Jordon Gilmore MD

## 2017-11-09 ENCOUNTER — TELEPHONE (OUTPATIENT)
Dept: INTERNAL MEDICINE | Age: 72
End: 2017-11-09

## 2017-11-10 ENCOUNTER — TELEPHONE (OUTPATIENT)
Dept: INTERNAL MEDICINE | Age: 72
End: 2017-11-10

## 2017-11-14 RX ORDER — CLORAZEPATE DIPOTASSIUM 7.5 MG/1
TABLET ORAL
Qty: 60 TABLET | Refills: 2 | Status: SHIPPED | OUTPATIENT
Start: 2017-11-14 | End: 2017-11-16 | Stop reason: SDUPTHER

## 2017-11-15 ENCOUNTER — TELEPHONE (OUTPATIENT)
Dept: INTERNAL MEDICINE | Age: 72
End: 2017-11-15

## 2017-11-16 RX ORDER — CLORAZEPATE DIPOTASSIUM 3.75 MG/1
TABLET ORAL
Qty: 60 TABLET | Refills: 1 | Status: SHIPPED | OUTPATIENT
Start: 2017-11-16 | End: 2017-11-17 | Stop reason: SDUPTHER

## 2017-11-16 NOTE — TELEPHONE ENCOUNTER
Yes the pharmacy told her they are not making the 7.5 they make the 3.75 and she is wanting this states the buspar does not help her at all [facility] and would like us to call it in if we can due to living in [facility]

## 2017-11-17 RX ORDER — CLORAZEPATE DIPOTASSIUM 3.75 MG/1
TABLET ORAL
Qty: 60 TABLET | Refills: 1 | Status: CANCELLED | OUTPATIENT
Start: 2017-11-17

## 2017-11-17 RX ORDER — CLORAZEPATE DIPOTASSIUM 3.75 MG/1
TABLET ORAL
Qty: 60 TABLET | Refills: 2 | Status: SHIPPED | OUTPATIENT
Start: 2017-11-17 | End: 2017-11-17 | Stop reason: ALTCHOICE

## 2017-11-17 RX ORDER — CLORAZEPATE DIPOTASSIUM 3.75 MG/1
3.75 TABLET ORAL 2 TIMES DAILY
Qty: 60 TABLET | Refills: 2 | Status: SHIPPED | OUTPATIENT
Start: 2017-11-17 | End: 2018-02-20 | Stop reason: SDUPTHER

## 2017-12-19 ENCOUNTER — HOSPITAL ENCOUNTER (INPATIENT)
Age: 72
LOS: 3 days | Discharge: HOME HEALTH | DRG: 189 | End: 2017-12-22
Attending: INTERNAL MEDICINE | Admitting: INTERNAL MEDICINE
Payer: MEDICARE

## 2017-12-19 PROCEDURE — 2700000000 HC OXYGEN THERAPY PER DAY

## 2017-12-19 PROCEDURE — 1210000000 HC MED SURG R&B

## 2017-12-19 PROCEDURE — 94660 CPAP INITIATION&MGMT: CPT

## 2017-12-20 PROBLEM — J96.22 ACUTE ON CHRONIC RESPIRATORY FAILURE WITH HYPERCAPNIA (HCC): Status: ACTIVE | Noted: 2017-12-20

## 2017-12-20 PROBLEM — J44.1 COPD EXACERBATION (HCC): Status: ACTIVE | Noted: 2017-12-20

## 2017-12-20 PROBLEM — J10.1 INFLUENZA A VIRUS PRESENT: Status: ACTIVE | Noted: 2017-12-20

## 2017-12-20 LAB
ANION GAP SERPL CALCULATED.3IONS-SCNC: 9 MMOL/L (ref 7–19)
BASE EXCESS ARTERIAL: 2.6 MMOL/L (ref -2–2)
BASE EXCESS ARTERIAL: 5.2 MMOL/L (ref -2–2)
BUN BLDV-MCNC: 21 MG/DL (ref 8–23)
CALCIUM SERPL-MCNC: 8.9 MG/DL (ref 8.8–10.2)
CARBOXYHEMOGLOBIN ARTERIAL: 1.8 % (ref 0–5)
CARBOXYHEMOGLOBIN ARTERIAL: 1.9 % (ref 0–5)
CHLORIDE BLD-SCNC: 99 MMOL/L (ref 98–111)
CO2: 32 MMOL/L (ref 22–29)
CREAT SERPL-MCNC: 0.7 MG/DL (ref 0.5–0.9)
GFR NON-AFRICAN AMERICAN: >60
GLUCOSE BLD-MCNC: 115 MG/DL (ref 74–109)
HCO3 ARTERIAL: 30.4 MMOL/L (ref 22–26)
HCO3 ARTERIAL: 34.4 MMOL/L (ref 22–26)
HCT VFR BLD CALC: 48.5 % (ref 37–47)
HEMOGLOBIN, ART, EXTENDED: 14.9 G/DL (ref 12–16)
HEMOGLOBIN, ART, EXTENDED: 15.2 G/DL (ref 12–16)
HEMOGLOBIN: 15 G/DL (ref 12–16)
MCH RBC QN AUTO: 32.5 PG (ref 27–31)
MCHC RBC AUTO-ENTMCNC: 30.9 G/DL (ref 33–37)
MCV RBC AUTO: 105.2 FL (ref 81–99)
METHEMOGLOBIN ARTERIAL: 0.7 %
METHEMOGLOBIN ARTERIAL: 1.1 %
O2 CONTENT ARTERIAL: 19.6 ML/DL
O2 CONTENT ARTERIAL: 20.5 ML/DL
O2 SAT, ARTERIAL: 93.8 %
O2 SAT, ARTERIAL: 95.4 %
O2 THERAPY: ABNORMAL
O2 THERAPY: ABNORMAL
PCO2 ARTERIAL: 59 MMHG (ref 35–45)
PCO2 ARTERIAL: 70 MMHG (ref 35–45)
PDW BLD-RTO: 12.4 % (ref 11.5–14.5)
PH ARTERIAL: 7.3 (ref 7.35–7.45)
PH ARTERIAL: 7.32 (ref 7.35–7.45)
PLATELET # BLD: 80 K/UL (ref 130–400)
PMV BLD AUTO: 11.1 FL (ref 9.4–12.3)
PO2 ARTERIAL: 102 MMHG (ref 80–100)
PO2 ARTERIAL: 68 MMHG (ref 80–100)
POTASSIUM SERPL-SCNC: 4.9 MMOL/L (ref 3.5–5)
POTASSIUM, WHOLE BLOOD: 3.7
POTASSIUM, WHOLE BLOOD: 4.7
RBC # BLD: 4.61 M/UL (ref 4.2–5.4)
SODIUM BLD-SCNC: 140 MMOL/L (ref 136–145)
WBC # BLD: 7.1 K/UL (ref 4.8–10.8)

## 2017-12-20 PROCEDURE — 1210000000 HC MED SURG R&B

## 2017-12-20 PROCEDURE — 6370000000 HC RX 637 (ALT 250 FOR IP): Performed by: CLINICAL NURSE SPECIALIST

## 2017-12-20 PROCEDURE — 2700000000 HC OXYGEN THERAPY PER DAY

## 2017-12-20 PROCEDURE — 2580000003 HC RX 258: Performed by: CLINICAL NURSE SPECIALIST

## 2017-12-20 PROCEDURE — 6360000002 HC RX W HCPCS: Performed by: CLINICAL NURSE SPECIALIST

## 2017-12-20 PROCEDURE — 85027 COMPLETE CBC AUTOMATED: CPT

## 2017-12-20 PROCEDURE — 51798 US URINE CAPACITY MEASURE: CPT

## 2017-12-20 PROCEDURE — 94640 AIRWAY INHALATION TREATMENT: CPT

## 2017-12-20 PROCEDURE — 36415 COLL VENOUS BLD VENIPUNCTURE: CPT

## 2017-12-20 PROCEDURE — 82803 BLOOD GASES ANY COMBINATION: CPT

## 2017-12-20 PROCEDURE — 36600 WITHDRAWAL OF ARTERIAL BLOOD: CPT

## 2017-12-20 PROCEDURE — 80048 BASIC METABOLIC PNL TOTAL CA: CPT

## 2017-12-20 PROCEDURE — 84132 ASSAY OF SERUM POTASSIUM: CPT

## 2017-12-20 PROCEDURE — 94660 CPAP INITIATION&MGMT: CPT

## 2017-12-20 RX ORDER — METHYLPREDNISOLONE SODIUM SUCCINATE 40 MG/ML
40 INJECTION, POWDER, LYOPHILIZED, FOR SOLUTION INTRAMUSCULAR; INTRAVENOUS EVERY 8 HOURS
Status: DISCONTINUED | OUTPATIENT
Start: 2017-12-21 | End: 2017-12-22 | Stop reason: HOSPADM

## 2017-12-20 RX ORDER — SODIUM CHLORIDE 0.9 % (FLUSH) 0.9 %
10 SYRINGE (ML) INJECTION PRN
Status: DISCONTINUED | OUTPATIENT
Start: 2017-12-20 | End: 2017-12-22 | Stop reason: HOSPADM

## 2017-12-20 RX ORDER — SODIUM CHLORIDE 0.9 % (FLUSH) 0.9 %
10 SYRINGE (ML) INJECTION EVERY 12 HOURS SCHEDULED
Status: DISCONTINUED | OUTPATIENT
Start: 2017-12-20 | End: 2017-12-22 | Stop reason: HOSPADM

## 2017-12-20 RX ORDER — LISINOPRIL 5 MG/1
5 TABLET ORAL DAILY
Status: DISCONTINUED | OUTPATIENT
Start: 2017-12-20 | End: 2017-12-22

## 2017-12-20 RX ORDER — VERAPAMIL HYDROCHLORIDE 240 MG/1
240 TABLET, FILM COATED, EXTENDED RELEASE ORAL DAILY
Status: DISCONTINUED | OUTPATIENT
Start: 2017-12-20 | End: 2017-12-22 | Stop reason: HOSPADM

## 2017-12-20 RX ORDER — SODIUM CHLORIDE 9 MG/ML
INJECTION, SOLUTION INTRAVENOUS CONTINUOUS
Status: DISCONTINUED | OUTPATIENT
Start: 2017-12-20 | End: 2017-12-21

## 2017-12-20 RX ORDER — IPRATROPIUM BROMIDE AND ALBUTEROL SULFATE 2.5; .5 MG/3ML; MG/3ML
1 SOLUTION RESPIRATORY (INHALATION)
Status: DISCONTINUED | OUTPATIENT
Start: 2017-12-20 | End: 2017-12-22 | Stop reason: HOSPADM

## 2017-12-20 RX ORDER — METHYLPREDNISOLONE SODIUM SUCCINATE 40 MG/ML
40 INJECTION, POWDER, LYOPHILIZED, FOR SOLUTION INTRAMUSCULAR; INTRAVENOUS EVERY 12 HOURS
Status: DISCONTINUED | OUTPATIENT
Start: 2017-12-20 | End: 2017-12-20

## 2017-12-20 RX ORDER — ONDANSETRON 2 MG/ML
4 INJECTION INTRAMUSCULAR; INTRAVENOUS EVERY 6 HOURS PRN
Status: DISCONTINUED | OUTPATIENT
Start: 2017-12-20 | End: 2017-12-22 | Stop reason: HOSPADM

## 2017-12-20 RX ORDER — OSELTAMIVIR PHOSPHATE 75 MG/1
75 CAPSULE ORAL 2 TIMES DAILY
Status: DISCONTINUED | OUTPATIENT
Start: 2017-12-20 | End: 2017-12-22 | Stop reason: HOSPADM

## 2017-12-20 RX ORDER — LEVOTHYROXINE SODIUM 0.1 MG/1
100 TABLET ORAL DAILY
Status: DISCONTINUED | OUTPATIENT
Start: 2017-12-20 | End: 2017-12-22 | Stop reason: HOSPADM

## 2017-12-20 RX ORDER — MONTELUKAST SODIUM 10 MG/1
10 TABLET ORAL DAILY
Status: DISCONTINUED | OUTPATIENT
Start: 2017-12-20 | End: 2017-12-22 | Stop reason: HOSPADM

## 2017-12-20 RX ORDER — NEOMYCIN SULFATE, POLYMYXIN B SULFATE AND HYDROCORTISONE 10; 3.5; 1 MG/ML; MG/ML; [USP'U]/ML
1 SUSPENSION/ DROPS AURICULAR (OTIC) EVERY 8 HOURS SCHEDULED
Status: DISCONTINUED | OUTPATIENT
Start: 2017-12-20 | End: 2017-12-22 | Stop reason: HOSPADM

## 2017-12-20 RX ORDER — ACETAMINOPHEN 325 MG/1
650 TABLET ORAL EVERY 4 HOURS PRN
Status: DISCONTINUED | OUTPATIENT
Start: 2017-12-20 | End: 2017-12-22 | Stop reason: HOSPADM

## 2017-12-20 RX ORDER — LEVOFLOXACIN 5 MG/ML
500 INJECTION, SOLUTION INTRAVENOUS EVERY 24 HOURS
Status: DISCONTINUED | OUTPATIENT
Start: 2017-12-20 | End: 2017-12-22 | Stop reason: HOSPADM

## 2017-12-20 RX ADMIN — LEVOTHYROXINE SODIUM 100 MCG: 100 TABLET ORAL at 09:01

## 2017-12-20 RX ADMIN — VERAPAMIL HYDROCHLORIDE 240 MG: 240 TABLET, FILM COATED, EXTENDED RELEASE ORAL at 08:43

## 2017-12-20 RX ADMIN — SODIUM CHLORIDE: 9 INJECTION, SOLUTION INTRAVENOUS at 05:33

## 2017-12-20 RX ADMIN — METHYLPREDNISOLONE SODIUM SUCCINATE 40 MG: 40 INJECTION, POWDER, FOR SOLUTION INTRAMUSCULAR; INTRAVENOUS at 05:39

## 2017-12-20 RX ADMIN — OSELTAMIVIR PHOSPHATE 75 MG: 75 CAPSULE ORAL at 08:42

## 2017-12-20 RX ADMIN — OSELTAMIVIR PHOSPHATE 75 MG: 75 CAPSULE ORAL at 20:04

## 2017-12-20 RX ADMIN — IPRATROPIUM BROMIDE AND ALBUTEROL SULFATE 1 AMPULE: .5; 3 SOLUTION RESPIRATORY (INHALATION) at 10:55

## 2017-12-20 RX ADMIN — METHYLPREDNISOLONE SODIUM SUCCINATE 40 MG: 40 INJECTION, POWDER, FOR SOLUTION INTRAMUSCULAR; INTRAVENOUS at 17:30

## 2017-12-20 RX ADMIN — LISINOPRIL 5 MG: 5 TABLET ORAL at 08:42

## 2017-12-20 RX ADMIN — IPRATROPIUM BROMIDE AND ALBUTEROL SULFATE 1 AMPULE: .5; 3 SOLUTION RESPIRATORY (INHALATION) at 15:08

## 2017-12-20 RX ADMIN — IPRATROPIUM BROMIDE AND ALBUTEROL SULFATE 1 AMPULE: .5; 3 SOLUTION RESPIRATORY (INHALATION) at 06:32

## 2017-12-20 RX ADMIN — ENOXAPARIN SODIUM 40 MG: 40 INJECTION SUBCUTANEOUS at 08:42

## 2017-12-20 RX ADMIN — IPRATROPIUM BROMIDE AND ALBUTEROL SULFATE 1 AMPULE: .5; 3 SOLUTION RESPIRATORY (INHALATION) at 19:51

## 2017-12-20 RX ADMIN — MONTELUKAST SODIUM 10 MG: 10 TABLET, FILM COATED ORAL at 08:42

## 2017-12-20 RX ADMIN — LEVOFLOXACIN 500 MG: 5 INJECTION, SOLUTION INTRAVENOUS at 20:04

## 2017-12-20 RX ADMIN — Medication 10 ML: at 08:43

## 2017-12-20 NOTE — H&P
[facility] Hospitalist    History & Physical        Patient:  Eh Curtis  YOB: 1945  Date of Service: 12/20/2017  MRN: [id number] Avilesf: [de-identified]   Primary Care Physician: Hernesto Poe MD    Chief Complaint:   Shortness of breath, flu like symptoms    History Obtained From:  electronic medical record, reason patient could not give history:  altered mental status    HISTORY OF PRESENT ILLNESS:  The patient is a 67 y.o. female who presents as a direct admission from [facility] due to acute respiratory failure. Patient unable to give accurate history at this time and no family in room. Currently on Bipap due to high levels of CO2 , is negative for PE but positive for flu . Patient in need for higher level of care and admitted for further evaluation and intervention. Past Medical History:        Diagnosis Date    Anxiety     Asthma     COPD (chronic obstructive pulmonary disease) ([address]     Hypertension     Hypothyroidism     Irritable bowel syndrome        Past Surgical History:        Procedure Laterality Date    HYSTERECTOMY, VAGINAL      PROCTOPEXY      abdominal approach    SUBTOTAL COLECTOMY         Allergies:  Nitrous oxide and Codeine    Medications Prior to Admission:    Prescriptions Prior to Admission: levothyroxine (LEVOTHROID) 100 MCG tablet, Take 1 tablet by mouth daily  lisinopril (PRINIVIL;ZESTRIL) 5 MG tablet, TAKE 1 TABLET BY MOUTH EVERY DAY  montelukast (SINGULAIR) 10 MG tablet, TAKE 1 TABLET EVERY DAY  neomycin-polymyxin-hydrocortisone 1 % SOLN otic solution, Place 1 drop into both ears every 8 hours  verapamil (CALAN SR) 240 MG extended release tablet, TAKE 1 TABLET EVERY DAY  [DISCONTINUED] levocetirizine (XYZAL) 5 MG tablet, Take 1 tablet by mouth nightly  [DISCONTINUED] busPIRone (BUSPAR) 5 MG tablet, Take 1 tablet by mouth 3 times daily as needed (anxiety)      Social History:    reports that she has been smoking Cigarettes.   She

## 2017-12-20 NOTE — PLAN OF CARE
Problem: Falls - Risk of  Intervention: Fall risk assessment  Pt is to call for assistance. Intervention: Postfall assessment  None. Intervention: Fall precautions  Pt is to call for assistance; fall precautions is in place. Intervention: Toileting assistance  Pt is to call for assistance.     Goal: Absence of falls  Outcome: Ongoing

## 2017-12-20 NOTE — PLAN OF CARE
Problem: Nutrition  Goal: Optimal nutrition therapy  Nutrition Problem: Underweight  Intervention: Food and/or Nutrient Delivery: Continue current diet  Nutritional Goals: po 50% or more, wt stable    Outcome: Ongoing

## 2017-12-20 NOTE — CARE COORDINATION
SW spoke to pt who stated she lives at home by herself. Pt stated she has family support that are able to help her with care if needed. Pt stated she has an inhaler, nebulizer, O2 concentrator for at night use. SW will continue to follow and assist as needed.

## 2017-12-21 ENCOUNTER — APPOINTMENT (OUTPATIENT)
Dept: GENERAL RADIOLOGY | Age: 72
DRG: 189 | End: 2017-12-21
Attending: INTERNAL MEDICINE
Payer: MEDICARE

## 2017-12-21 LAB
ANION GAP SERPL CALCULATED.3IONS-SCNC: 7 MMOL/L (ref 7–19)
BASE EXCESS ARTERIAL: 7.6 MMOL/L (ref -2–2)
BUN BLDV-MCNC: 29 MG/DL (ref 8–23)
CALCIUM SERPL-MCNC: 9 MG/DL (ref 8.8–10.2)
CARBOXYHEMOGLOBIN ARTERIAL: 1.6 % (ref 0–5)
CHLORIDE BLD-SCNC: 102 MMOL/L (ref 98–111)
CO2: 31 MMOL/L (ref 22–29)
CREAT SERPL-MCNC: 0.8 MG/DL (ref 0.5–0.9)
GFR NON-AFRICAN AMERICAN: >60
GLUCOSE BLD-MCNC: 116 MG/DL (ref 74–109)
HCO3 ARTERIAL: 34.9 MMOL/L (ref 22–26)
HCT VFR BLD CALC: 44.5 % (ref 37–47)
HEMOGLOBIN, ART, EXTENDED: 14.6 G/DL (ref 12–16)
HEMOGLOBIN: 13.7 G/DL (ref 12–16)
MAGNESIUM: 1.9 MG/DL (ref 1.6–2.4)
MCH RBC QN AUTO: 32.4 PG (ref 27–31)
MCHC RBC AUTO-ENTMCNC: 30.8 G/DL (ref 33–37)
MCV RBC AUTO: 105.2 FL (ref 81–99)
METHEMOGLOBIN ARTERIAL: 0.6 %
O2 CONTENT ARTERIAL: 19.4 ML/DL
O2 SAT, ARTERIAL: 94.5 %
O2 THERAPY: ABNORMAL
PCO2 ARTERIAL: 59 MMHG (ref 35–45)
PDW BLD-RTO: 12.3 % (ref 11.5–14.5)
PH ARTERIAL: 7.38 (ref 7.35–7.45)
PLATELET # BLD: 103 K/UL (ref 130–400)
PMV BLD AUTO: 11.4 FL (ref 9.4–12.3)
PO2 ARTERIAL: 71 MMHG (ref 80–100)
POTASSIUM SERPL-SCNC: 4.4 MMOL/L (ref 3.5–5)
POTASSIUM, WHOLE BLOOD: 3.8
RBC # BLD: 4.23 M/UL (ref 4.2–5.4)
SODIUM BLD-SCNC: 140 MMOL/L (ref 136–145)
WBC # BLD: 8.5 K/UL (ref 4.8–10.8)

## 2017-12-21 PROCEDURE — 83735 ASSAY OF MAGNESIUM: CPT

## 2017-12-21 PROCEDURE — 80048 BASIC METABOLIC PNL TOTAL CA: CPT

## 2017-12-21 PROCEDURE — 84132 ASSAY OF SERUM POTASSIUM: CPT

## 2017-12-21 PROCEDURE — 94660 CPAP INITIATION&MGMT: CPT

## 2017-12-21 PROCEDURE — 85027 COMPLETE CBC AUTOMATED: CPT

## 2017-12-21 PROCEDURE — 1210000000 HC MED SURG R&B

## 2017-12-21 PROCEDURE — 99233 SBSQ HOSP IP/OBS HIGH 50: CPT | Performed by: INTERNAL MEDICINE

## 2017-12-21 PROCEDURE — 6370000000 HC RX 637 (ALT 250 FOR IP): Performed by: CLINICAL NURSE SPECIALIST

## 2017-12-21 PROCEDURE — 6360000002 HC RX W HCPCS: Performed by: CLINICAL NURSE SPECIALIST

## 2017-12-21 PROCEDURE — 2700000000 HC OXYGEN THERAPY PER DAY

## 2017-12-21 PROCEDURE — 6360000002 HC RX W HCPCS: Performed by: INTERNAL MEDICINE

## 2017-12-21 PROCEDURE — 82803 BLOOD GASES ANY COMBINATION: CPT

## 2017-12-21 PROCEDURE — 36600 WITHDRAWAL OF ARTERIAL BLOOD: CPT

## 2017-12-21 PROCEDURE — 94640 AIRWAY INHALATION TREATMENT: CPT

## 2017-12-21 PROCEDURE — 2580000003 HC RX 258: Performed by: CLINICAL NURSE SPECIALIST

## 2017-12-21 PROCEDURE — 36415 COLL VENOUS BLD VENIPUNCTURE: CPT

## 2017-12-21 PROCEDURE — 71010 XR CHEST PORTABLE: CPT

## 2017-12-21 RX ADMIN — VERAPAMIL HYDROCHLORIDE 240 MG: 240 TABLET, FILM COATED, EXTENDED RELEASE ORAL at 09:12

## 2017-12-21 RX ADMIN — NEOMYCIN SULFATE, POLYMYXIN B SULFATE AND HYDROCORTISONE 1 DROP: 10; 3.5; 1 SUSPENSION/ DROPS AURICULAR (OTIC) at 13:13

## 2017-12-21 RX ADMIN — METHYLPREDNISOLONE SODIUM SUCCINATE 40 MG: 40 INJECTION, POWDER, FOR SOLUTION INTRAMUSCULAR; INTRAVENOUS at 17:14

## 2017-12-21 RX ADMIN — METHYLPREDNISOLONE SODIUM SUCCINATE 40 MG: 40 INJECTION, POWDER, FOR SOLUTION INTRAMUSCULAR; INTRAVENOUS at 01:37

## 2017-12-21 RX ADMIN — IPRATROPIUM BROMIDE AND ALBUTEROL SULFATE 1 AMPULE: .5; 3 SOLUTION RESPIRATORY (INHALATION) at 11:15

## 2017-12-21 RX ADMIN — IPRATROPIUM BROMIDE AND ALBUTEROL SULFATE 1 AMPULE: .5; 3 SOLUTION RESPIRATORY (INHALATION) at 07:27

## 2017-12-21 RX ADMIN — ENOXAPARIN SODIUM 40 MG: 40 INJECTION SUBCUTANEOUS at 09:12

## 2017-12-21 RX ADMIN — OSELTAMIVIR PHOSPHATE 75 MG: 75 CAPSULE ORAL at 09:12

## 2017-12-21 RX ADMIN — Medication 10 ML: at 09:13

## 2017-12-21 RX ADMIN — IPRATROPIUM BROMIDE AND ALBUTEROL SULFATE 1 AMPULE: .5; 3 SOLUTION RESPIRATORY (INHALATION) at 20:28

## 2017-12-21 RX ADMIN — NEOMYCIN SULFATE, POLYMYXIN B SULFATE AND HYDROCORTISONE 1 DROP: 10; 3.5; 1 SUSPENSION/ DROPS AURICULAR (OTIC) at 20:57

## 2017-12-21 RX ADMIN — LEVOFLOXACIN 500 MG: 5 INJECTION, SOLUTION INTRAVENOUS at 20:56

## 2017-12-21 RX ADMIN — OSELTAMIVIR PHOSPHATE 75 MG: 75 CAPSULE ORAL at 20:56

## 2017-12-21 RX ADMIN — IPRATROPIUM BROMIDE AND ALBUTEROL SULFATE 1 AMPULE: .5; 3 SOLUTION RESPIRATORY (INHALATION) at 15:32

## 2017-12-21 RX ADMIN — Medication 10 ML: at 20:59

## 2017-12-21 RX ADMIN — MONTELUKAST SODIUM 10 MG: 10 TABLET, FILM COATED ORAL at 09:12

## 2017-12-21 RX ADMIN — METHYLPREDNISOLONE SODIUM SUCCINATE 40 MG: 40 INJECTION, POWDER, FOR SOLUTION INTRAMUSCULAR; INTRAVENOUS at 09:20

## 2017-12-21 RX ADMIN — LEVOTHYROXINE SODIUM 100 MCG: 100 TABLET ORAL at 06:28

## 2017-12-21 RX ADMIN — LISINOPRIL 5 MG: 5 TABLET ORAL at 09:12

## 2017-12-21 ASSESSMENT — ENCOUNTER SYMPTOMS
SHORTNESS OF BREATH: 1
EYES NEGATIVE: 1
GASTROINTESTINAL NEGATIVE: 1

## 2017-12-22 VITALS
BODY MASS INDEX: 18.46 KG/M2 | TEMPERATURE: 99.6 F | RESPIRATION RATE: 20 BRPM | HEART RATE: 76 BPM | SYSTOLIC BLOOD PRESSURE: 152 MMHG | HEIGHT: 68 IN | OXYGEN SATURATION: 97 % | WEIGHT: 121.8 LBS | DIASTOLIC BLOOD PRESSURE: 76 MMHG

## 2017-12-22 LAB
D DIMER: 0.76 UG/ML FEU (ref 0–0.48)
LV EF: 58 %
LVEF MODALITY: NORMAL
MAGNESIUM: 1.8 MG/DL (ref 1.6–2.4)
PRO-BNP: 2134 PG/ML (ref 0–900)

## 2017-12-22 PROCEDURE — 6360000002 HC RX W HCPCS: Performed by: CLINICAL NURSE SPECIALIST

## 2017-12-22 PROCEDURE — 2580000003 HC RX 258: Performed by: CLINICAL NURSE SPECIALIST

## 2017-12-22 PROCEDURE — 94640 AIRWAY INHALATION TREATMENT: CPT

## 2017-12-22 PROCEDURE — 94660 CPAP INITIATION&MGMT: CPT

## 2017-12-22 PROCEDURE — 36415 COLL VENOUS BLD VENIPUNCTURE: CPT

## 2017-12-22 PROCEDURE — 2700000000 HC OXYGEN THERAPY PER DAY

## 2017-12-22 PROCEDURE — 6370000000 HC RX 637 (ALT 250 FOR IP): Performed by: HOSPITALIST

## 2017-12-22 PROCEDURE — 83735 ASSAY OF MAGNESIUM: CPT

## 2017-12-22 PROCEDURE — 6370000000 HC RX 637 (ALT 250 FOR IP): Performed by: CLINICAL NURSE SPECIALIST

## 2017-12-22 PROCEDURE — 6360000002 HC RX W HCPCS: Performed by: INTERNAL MEDICINE

## 2017-12-22 PROCEDURE — 93306 TTE W/DOPPLER COMPLETE: CPT

## 2017-12-22 PROCEDURE — 85379 FIBRIN DEGRADATION QUANT: CPT

## 2017-12-22 PROCEDURE — 94761 N-INVAS EAR/PLS OXIMETRY MLT: CPT

## 2017-12-22 PROCEDURE — 99239 HOSP IP/OBS DSCHRG MGMT >30: CPT | Performed by: HOSPITALIST

## 2017-12-22 PROCEDURE — 83880 ASSAY OF NATRIURETIC PEPTIDE: CPT

## 2017-12-22 RX ORDER — OSELTAMIVIR PHOSPHATE 75 MG/1
75 CAPSULE ORAL 2 TIMES DAILY
Qty: 6 CAPSULE | Refills: 0 | Status: SHIPPED | OUTPATIENT
Start: 2017-12-22 | End: 2017-12-25

## 2017-12-22 RX ORDER — PREDNISONE 10 MG/1
TABLET ORAL
Qty: 21 TABLET | Refills: 0 | Status: SHIPPED | OUTPATIENT
Start: 2017-12-22

## 2017-12-22 RX ORDER — LISINOPRIL 20 MG/1
20 TABLET ORAL DAILY
Status: DISCONTINUED | OUTPATIENT
Start: 2017-12-22 | End: 2017-12-22 | Stop reason: HOSPADM

## 2017-12-22 RX ORDER — IPRATROPIUM BROMIDE AND ALBUTEROL SULFATE 2.5; .5 MG/3ML; MG/3ML
3 SOLUTION RESPIRATORY (INHALATION) EVERY 4 HOURS
Qty: 126 ML | Refills: 0 | Status: SHIPPED | OUTPATIENT
Start: 2017-12-22 | End: 2017-12-29

## 2017-12-22 RX ORDER — LEVOFLOXACIN 500 MG/1
500 TABLET, FILM COATED ORAL DAILY
Qty: 3 TABLET | Refills: 0 | Status: SHIPPED | OUTPATIENT
Start: 2017-12-22 | End: 2017-12-25

## 2017-12-22 RX ORDER — FUROSEMIDE 10 MG/ML
40 INJECTION INTRAMUSCULAR; INTRAVENOUS ONCE
Status: DISCONTINUED | OUTPATIENT
Start: 2017-12-22 | End: 2017-12-22 | Stop reason: HOSPADM

## 2017-12-22 RX ADMIN — IPRATROPIUM BROMIDE AND ALBUTEROL SULFATE 1 AMPULE: .5; 3 SOLUTION RESPIRATORY (INHALATION) at 06:45

## 2017-12-22 RX ADMIN — IPRATROPIUM BROMIDE AND ALBUTEROL SULFATE 1 AMPULE: .5; 3 SOLUTION RESPIRATORY (INHALATION) at 10:30

## 2017-12-22 RX ADMIN — Medication 10 ML: at 10:20

## 2017-12-22 RX ADMIN — MONTELUKAST SODIUM 10 MG: 10 TABLET, FILM COATED ORAL at 10:19

## 2017-12-22 RX ADMIN — ENOXAPARIN SODIUM 40 MG: 40 INJECTION SUBCUTANEOUS at 10:19

## 2017-12-22 RX ADMIN — LISINOPRIL 20 MG: 20 TABLET ORAL at 10:21

## 2017-12-22 RX ADMIN — METHYLPREDNISOLONE SODIUM SUCCINATE 40 MG: 40 INJECTION, POWDER, FOR SOLUTION INTRAMUSCULAR; INTRAVENOUS at 01:09

## 2017-12-22 RX ADMIN — NEOMYCIN SULFATE, POLYMYXIN B SULFATE AND HYDROCORTISONE 1 DROP: 10; 3.5; 1 SUSPENSION/ DROPS AURICULAR (OTIC) at 14:48

## 2017-12-22 RX ADMIN — METHYLPREDNISOLONE SODIUM SUCCINATE 40 MG: 40 INJECTION, POWDER, FOR SOLUTION INTRAMUSCULAR; INTRAVENOUS at 10:19

## 2017-12-22 RX ADMIN — OSELTAMIVIR PHOSPHATE 75 MG: 75 CAPSULE ORAL at 10:19

## 2017-12-22 RX ADMIN — VERAPAMIL HYDROCHLORIDE 240 MG: 240 TABLET, FILM COATED, EXTENDED RELEASE ORAL at 10:19

## 2017-12-22 RX ADMIN — METHYLPREDNISOLONE SODIUM SUCCINATE 40 MG: 40 INJECTION, POWDER, FOR SOLUTION INTRAMUSCULAR; INTRAVENOUS at 16:34

## 2017-12-22 RX ADMIN — IPRATROPIUM BROMIDE AND ALBUTEROL SULFATE 1 AMPULE: .5; 3 SOLUTION RESPIRATORY (INHALATION) at 15:09

## 2017-12-22 RX ADMIN — LEVOTHYROXINE SODIUM 100 MCG: 100 TABLET ORAL at 06:10

## 2017-12-22 RX ADMIN — NEOMYCIN SULFATE, POLYMYXIN B SULFATE AND HYDROCORTISONE 1 DROP: 10; 3.5; 1 SUSPENSION/ DROPS AURICULAR (OTIC) at 06:10

## 2017-12-22 NOTE — DISCHARGE SUMMARY
Physician Discharge Summary     Patient ID:  Amadou Matos  [id number]  68 y.o.  1945    Admit date: 12/19/2017    Discharge date and time: 11/22/2017    Admitting Physician: Lora Barry DO     Discharge Physician: Jazzy Pressley MD    Admission Diagnoses: Acute on chronic respiratory failure with hypercapnia [facility]) [J96.22]    Discharge Diagnoses:     Acute on chronic respiratory failure with hypercapnia - refused bipap , follow up OP  Elevated d dimer- refused CTA chest, follow up OP  Elevated BNP- refused lasix, follow up OP  Irritable bowel syndrome   Hypothyroidism   Hypertension   COPD exacerbation - nebs, steroids  Influenza A virus- tamiflu    Discharged Condition: stable    Indication for Admission: acute resp failure     Hospital Course:     67 y.o. female presented as a direct admission from [facility] due to acute respiratory failure. Patient was unable to give accurate history. Initially she was treated with Bipap due to high levels of CO2 which she refused later. She refused CTA chest and refused tx with lasix for elevated BNP. She was treated with levofloxacin, solumedrol and duonebs. She is requiring continues home oxygen. CXR was negative. Echo results below. She was found to have Influeza A and was treated with Tamiflu. Consults: none    Significant Diagnostic Studies:     Echo 12/22/2017  Normal left ventricular size with preserved LV function and an estimated   ejection fraction of approximately 55-60%.   No evidence of left ventricular mass or thrombus noted. XR Chest Portable [[id number]] Resulted: 12/21/17 2032     Order Status: Completed Updated: 12/21/17 1934     Narrative:       XR CHEST PORTABLE   12/21/2017 7:32 PM  History: Hypoxia. Portable chest x-ray with no comparison. The heart size is normal. The mediastinum is within normal limits. The lungs are normally expanded with no pneumonia or pneumothorax.    No congestive failure changes.         po daily for 3 days, then 20 mg po daily for 3 days then 10 mg po daily for 3 days             verapamil (CALAN SR) 240 MG extended release tablet  TAKE 1 TABLET EVERY DAY                   Discharge Instructions: Follow up with Eula Rangel MD in 4 days. Take medications as directed. Resume activity as tolerated. Diet: DIET GENERAL; Safety Tray     Disposition: Patient is medically stable and will be discharged home with [facility].  On home oxygen     Dc time 33 min    Yemi Wilson MD

## 2017-12-22 NOTE — PROGRESS NOTES
12 hour chart check completed.
12 hour chart check completed.
12 hour chart check review completed. Electronically signed by Mervat Kruse LPN on 82/20/8646 at 5:21 AM
Blood Gas, Arterial [[id number]] (Abnormal) Collected: 12/20/17 0804     Specimen: Blood gases Updated: 12/20/17 0808      pH, Arterial 7.300 (L)      pCO2, Arterial 70.0 (HH) mmHg       pO2, Arterial 102.0 (H) mmHg       HCO3, Arterial 34.4 (H) mmol/L       Base Excess, Arterial 5.2 (H) mmol/L       Hemoglobin, Art, Extended 15.2 g/dL       O2 Sat, Arterial 95.4 %       Carboxyhgb, Arterial 1.9 %       Methemoglobin, Arterial 1.1 %       O2 Content, Arterial 20.5 mL/dL       O2 Therapy Unknown     Narrative:       Mara Frazier [id number],  RN Emily Quigley, 12/20/2017 08:08, by Tessa Kelsey     Potassium, Whole Blood [[id number]] Collected: 12/20/17 0804      Updated: 12/20/17 0805      Potassium, Whole Blood 4.7     AT +  RB  Bipap 16/6 @ 8 LPM  Resting  RR 20
Nutrition Assessment    Type and Reason for Visit: Reassess    Nutrition Recommendations: continue POC    Malnutrition Assessment:  · Malnutrition Status: Insufficient data  · Context: Acute illness or injury  · Findings of the 6 clinical characteristics of malnutrition (Minimum of 2 out of 6 clinical characteristics is required to make the diagnosis of moderate or severe Protein Calorie Malnutrition based on AND/ASPEN Guidelines):  1. Energy Intake-Less than or equal to 75%, not able to assess    2. Weight Loss-2% loss or greater, unable to assess  3. Fat Loss-Unable to assess,    4. Muscle Loss-Unable to assess,    5. Fluid Accumulation-Unable to assess,    6.  Strength-     Nutrition Diagnosis:   · Problem: Underweight  · Etiology: related to Insufficient energy/nutrient consumption     Signs and symptoms:  as evidenced by BMI (18.4)    Nutrition Assessment:  · Subjective Assessment: Intake improved, pt attributes increased appetite to steroids. · Nutrition-Focused Physical Findings: nasal cannula  · Wound Type: None  · Current Nutrition Therapies:  · Oral Diet Orders: General   · Oral Diet intake: 51-75% (variable)  · Anthropometric Measures:  · Ht: 5' 8\" (172.7 cm)   · Current Body Wt:    · Admission Body Wt: 121 lb (54.9 kg)  · Usual Body Wt: 125 lb (56.7 kg)  · % Weight Change: 4,  unable to determine  · Ideal Body Wt: 140 lb (63.5 kg), % Ideal Body    · BMI Classification: BMI <18.5 Underweight    Estimated Intake vs Estimated Needs: Intake Improving    Nutrition Risk Level: Moderate    Nutrition Interventions:   Continue current diet  Continued Inpatient Monitoring    Nutrition Evaluation:   · Evaluation: Progressing toward goals   · Goals: po 50% or more, wt stable    · Monitoring: Meal Intake, Weight, Pertinent Labs    See Adult Nutrition Doc Flowsheet for more detail.      Electronically signed by Trudy Leyva, MS, RD, LD on 12/22/17 at 1:55 PM    Contact Number: [phone]
Patient has refused to wear the bipap tonight. She stated that she tried to wear it yesterday and was unable to tolerate it. 02 sat has been okay on nasal cannula, but explained to the patient the bipap has other benefits, but she is still refusing. Will continue to monitor.
Progress Note  12/21/2017 10:15 AM  Subjective:   Admit Date: 12/19/2017  PCP: Eula Rangel MD      Subjective: pt seen and examined. Currently on nasal cannula as she has been refusing bipap. No somnolence on exam. Pt states she is feeling better. No chest pain. No n/v. No overnight events noted. ROS: 14 point review of systems is negative except as specifically addressed above.     DIET GENERAL; Safety Tray    Intake/Output Summary (Last 24 hours) at 12/21/17 1015  Last data filed at 12/21/17 6982   Gross per 24 hour   Intake             1300 ml   Output              825 ml   Net              475 ml     Medications:   sodium chloride 75 mL/hr at 12/20/17 0533     Current Facility-Administered Medications   Medication Dose Route Frequency Provider Last Rate Last Dose    lisinopril (PRINIVIL;ZESTRIL) tablet 5 mg  5 mg Oral Daily Mark Cullen APRN   5 mg at 12/21/17 0912    montelukast (SINGULAIR) tablet 10 mg  10 mg Oral Daily Mark Cullen APRN   10 mg at 12/21/17 0912    verapamil (CALAN SR) extended release tablet 240 mg  240 mg Oral Daily Mark Cullen APRN   240 mg at 12/21/17 0912    neomycin-polymyxin-hydrocortisone (CORTISPORIN) otic suspension 1 drop  1 drop Both Ears 3 times per day FARHEEN Leos        oseltamivir (TAMIFLU) capsule 75 mg  75 mg Oral BID FARHEEN Leos   75 mg at 12/21/17 5482    sodium chloride flush 0.9 % injection 10 mL  10 mL Intravenous 2 times per day FARHEEN Leos   10 mL at 12/21/17 0913    sodium chloride flush 0.9 % injection 10 mL  10 mL Intravenous PRN FARHEEN Leos        acetaminophen (TYLENOL) tablet 650 mg  650 mg Oral Q4H PRN FARHEEN Leos        magnesium hydroxide (MILK OF MAGNESIA) 400 MG/5ML suspension 30 mL  30 mL Oral Daily PRN FARHEEN Leos        ondansetron [facility] injection 4 mg  4 mg Intravenous Q6H PRN FARHEEN Leos        enoxaparin (LOVENOX) injection 40 mg  40 mg Subcutaneous Daily FARHEEN Leos
Pt as refused CTA and lasix.
Pt refused bipap mask . I called RT and he (RT) came up and talked to pt and placed her on nasal canula.
Pt still refusing bipap machine. However, she is keeping the nasal canula on.
Pt voided 25 cc. Bladder scan showed 360 mL. Received order for straight cath 1  X.  300 ml kemi urine returned.   Electronically signed by Sole Avina RN on 12/20/2017 at 5:49 PM
While giving patient her morning medication, her o2 sat dropped to 70% without her bipap on. Bipap was off approximately 3-4 minutes.     Electronically signed by Patricia Noguera RN on 12/20/2017 at 9:25 AM
  Blood Gas, Arterial [[id number]] (Abnormal) Collected: 12/20/17 1257     Specimen: Blood gases Updated: 12/20/17 1259      pH, Arterial 7.320 (L)      pCO2, Arterial 59.0 (H) mmHg       pO2, Arterial 68.0 (L) mmHg       HCO3, Arterial 30.4 (H) mmol/L       Base Excess, Arterial 2.6 (H) mmol/L       Hemoglobin, Art, Extended 14.9 g/dL       O2 Sat, Arterial 93.8 %       Carboxyhgb, Arterial 1.8 %       Methemoglobin, Arterial 0.7 %       O2 Content, Arterial 19.6 mL/dL       O2 Therapy Unknown     Potassium, Whole Blood [[id number]] Collected: 12/20/17 1257      Updated: 12/20/17 1258      Potassium, Whole Blood 3.7     AT +  RR  Resting  Bipap 20/6 @ 3 LPM
[employer]

## 2017-12-26 ENCOUNTER — TELEPHONE (OUTPATIENT)
Dept: INTERNAL MEDICINE | Age: 72
End: 2017-12-26

## 2017-12-26 NOTE — TELEPHONE ENCOUNTER
[facility] Transitions Initial Follow Up Call    Call within 2 business days of discharge:yes    Patient: Germán Pereira Patient : 1945   MRN: [id number]  Reason for Admission: Chronic resp. Failure with hypercapniaAdmit date: 2017     Discharge date and time: 2017     Admitting Physician: Flori Bahena, DO      Discharge Physician: Daniella Bradford MD     Admission Diagnoses: Acute on chronic respiratory failure with hypercapnia ([address] [J96.22]     Discharge Diagnoses:      Acute on chronic respiratory failure with hypercapnia - refused bipap , follow up OP  Elevated d dimer- refused CTA chest, follow up OP  Elevated BNP- refused lasix, follow up OP  Irritable bowel syndrome   Hypothyroidism   Hypertension   COPD exacerbation - nebs, steroids  Influenza A virus- tamiflu     Discharged Condition: stable      Discharge Date: 17   RARS: [email or web address]     Spoke with: Left patient a message to call back for a HFU appt. With Dr. Bell Meals: Pankaj Lujan services provided:  Left message x 2 to call our office to make a HFU appt. With Dr. Germán Pereira and also to call us if any questions about discharge instructions or medications.     Follow Up  Future Appointments  Date Time Provider Rohit Florian   2018 4:00 PM Xena Tejeda MD [facility] Betancourt LPN

## 2018-01-04 ENCOUNTER — TELEPHONE (OUTPATIENT)
Dept: INTERNAL MEDICINE | Age: 73
End: 2018-01-04

## 2018-01-04 RX ORDER — CEFDINIR 300 MG/1
300 CAPSULE ORAL 2 TIMES DAILY
Qty: 14 CAPSULE | Refills: 0 | Status: SHIPPED | OUTPATIENT
Start: 2018-01-04

## 2018-02-21 RX ORDER — CLORAZEPATE DIPOTASSIUM 3.75 MG/1
TABLET ORAL
Qty: 60 TABLET | Refills: 2 | Status: SHIPPED | OUTPATIENT
Start: 2018-02-21 | End: 2019-02-21

## 2018-03-21 ENCOUNTER — TELEPHONE (OUTPATIENT)
Dept: PRIMARY CARE CLINIC | Age: 73
End: 2018-03-21

## 2018-09-26 PROBLEM — Z01.89 ROUTINE LAB DRAW: Status: RESOLVED | Noted: 2017-09-08 | Resolved: 2018-09-26

## 2018-12-24 RX ORDER — VERAPAMIL HYDROCHLORIDE 240 MG/1
TABLET, FILM COATED, EXTENDED RELEASE ORAL
Qty: 90 TABLET | Refills: 0 | OUTPATIENT
Start: 2018-12-24

## 2018-12-24 RX ORDER — LEVOTHYROXINE SODIUM 0.1 MG/1
TABLET ORAL
Qty: 90 TABLET | Refills: 0 | OUTPATIENT
Start: 2018-12-24

## 2019-01-28 RX ORDER — IPRATROPIUM BROMIDE 17 UG/1
AEROSOL, METERED RESPIRATORY (INHALATION)
Qty: 1 INHALER | Refills: 3 | Status: SHIPPED | OUTPATIENT
Start: 2019-01-28

## 2019-01-29 RX ORDER — IPRATROPIUM BROMIDE 17 UG/1
AEROSOL, METERED RESPIRATORY (INHALATION)
Qty: 51.6 INHALER | Refills: 3 | OUTPATIENT
Start: 2019-01-29

## 2019-02-27 NOTE — TELEPHONE ENCOUNTER
Patient requesting refills on the atrovent inhaler. She wants a 3 months supply with 3 refills sent to [facility].

## 2019-06-03 RX ORDER — LEVALBUTEROL INHALATION SOLUTION 1.25 MG/3ML
SOLUTION RESPIRATORY (INHALATION)
Qty: 288 ML | Refills: 0 | Status: SHIPPED | OUTPATIENT
Start: 2019-06-03 | End: 2019-09-09 | Stop reason: SDUPTHER

## 2019-06-24 ENCOUNTER — OFFICE VISIT (OUTPATIENT)
Dept: PULMONOLOGY | Facility: CLINIC | Age: 74
End: 2019-06-24

## 2019-06-24 VITALS
HEART RATE: 60 BPM | WEIGHT: 153.4 LBS | OXYGEN SATURATION: 94 % | SYSTOLIC BLOOD PRESSURE: 130 MMHG | HEIGHT: 67 IN | DIASTOLIC BLOOD PRESSURE: 90 MMHG | BODY MASS INDEX: 24.08 KG/M2

## 2019-06-24 DIAGNOSIS — Z87.891 PERSONAL HISTORY OF NICOTINE DEPENDENCE: ICD-10-CM

## 2019-06-24 DIAGNOSIS — J44.9 COPD, VERY SEVERE (HCC): Primary | ICD-10-CM

## 2019-06-24 LAB
FEV1/FVC: NORMAL %
FEV1: NORMAL LITERS
FVC VOL RESPIRATORY: NORMAL LITERS

## 2019-06-24 PROCEDURE — 94664 DEMO&/EVAL PT USE INHALER: CPT | Performed by: INTERNAL MEDICINE

## 2019-06-24 PROCEDURE — 94010 BREATHING CAPACITY TEST: CPT | Performed by: INTERNAL MEDICINE

## 2019-06-24 PROCEDURE — 99213 OFFICE O/P EST LOW 20 MIN: CPT | Performed by: INTERNAL MEDICINE

## 2019-06-24 RX ORDER — DILTIAZEM HYDROCHLORIDE 240 MG/1
CAPSULE, COATED, EXTENDED RELEASE ORAL
COMMUNITY
Start: 2019-06-18

## 2019-06-24 NOTE — PROGRESS NOTES
Subjective   Robbin Garcia is a 74 y.o. female.     Chief Complaint   Patient presents with   • Shortness of Breath   • Cough      No My Sticky Note on file.    History of Present Illness   Patient history of COPD comes today for follow-up.  She is been able to stay off cigarettes now for over a year.  She is having more difficulty with her breathing I think some of this relates to environmental factors that she states she is in a dwelling were some mold is present.  She has had to use her neb treatments a bit more frequently.  I did tell her she might be a good candidate to switch her Atrovent to Spiriva to see if the once a day therapy results in better control of her symptoms on a regular basis I did give her samples of Spiriva Respimat and instructed on its use with the dose being 2 puffs daily and if she prefers this to Atrovent contact the office we will get her additional samples or prescription.    Medical/Family/Social History   has a past medical history of Anxiety, Arthritis, Asthma, COPD (chronic obstructive pulmonary disease) (CMS/HCC), Disease of thyroid gland, Ear infection, Hypertension, Hypoxia, IBS (irritable bowel syndrome), Sleep apnea, Vitamin D deficiency, and Weakness of hand.   has a past surgical history that includes Colonoscopy; Rectal prolapse repair; Hysterectomy; Dilation and curettage of uterus; Other surgical history; and Ulnar nerve transposition (Left, 4/19/2017).  family history is not on file.   reports that she has quit smoking. Her smoking use included cigarettes. She has a 50.00 pack-year smoking history. She has never used smokeless tobacco. She reports that she does not drink alcohol or use drugs.  Allergies   Allergen Reactions   • Nitrous Oxide Shortness Of Breath     Pt states she quit breathing   • Mold Extract [Trichophyton] Rash   • Codeine Other (See Comments)     Double vision     Medications    Current Outpatient Medications:   •  ATROVENT HFA 17 MCG/ACT inhaler, TAKE  2 PUFFS BY MOUTH 4 TIMES A DAY, Disp: 1 inhaler, Rfl: 3  •  cholecalciferol (VITAMIN D3) 1000 UNITS tablet, Take 1,000 Units by mouth Daily., Disp: , Rfl:   •  clorazepate (TRANXENE) 7.5 MG tablet, Take 7.5 mg by mouth 2 (Two) Times a Day As Needed for Anxiety., Disp: , Rfl:   •  diltiaZEM CD (CARDIZEM CD) 240 MG 24 hr capsule, , Disp: , Rfl:   •  ipratropium (ATROVENT HFA) 17 MCG/ACT inhaler, Inhale 2 puffs 4 (Four) Times a Day., Disp: 3 inhaler, Rfl: 3  •  levalbuterol (XOPENEX) 1.25 MG/3ML nebulizer solution, INHALE THE CONTENTS OF ONE VIAL VIA NEBULIZER EVERY 8 HOURS, Disp: 288 mL, Rfl: 0  •  levocetirizine (XYZAL) 5 MG tablet, Take 5 mg by mouth Every Evening., Disp: , Rfl:   •  levothyroxine (SYNTHROID, LEVOTHROID) 100 MCG tablet, Take 100 mcg by mouth Daily., Disp: , Rfl:   •  lisinopril (PRINIVIL,ZESTRIL) 5 MG tablet, Take 5 mg by mouth Daily., Disp: , Rfl:   •  montelukast (SINGULAIR) 10 MG tablet, Take 10 mg by mouth Every Night., Disp: , Rfl:   •  O2 (OXYGEN), Inhale 2 L/min Daily., Disp: , Rfl:   •  azelastine (ASTELIN) 0.1 % nasal spray, 2 sprays into each nostril 2 (Two) Times a Day As Needed for Rhinitis or Allergies. Use in each nostril as directed, Disp: , Rfl:   •  neomycin-polymyxin-hydrocortisone (CORTISPORIN) 3.5-22129-8 otic solution, Administer 3 drops into both ears 4 (Four) Times a Day., Disp: , Rfl:   •  tiotropium bromide monohydrate (SPIRIVA RESPIMAT) 2.5 MCG/ACT aerosol solution inhaler, Inhale 2 puffs Daily., Disp: 2 inhaler, Rfl: 0  •  verapamil SR (CALAN-SR) 240 MG CR tablet, Take 240 mg by mouth Every Night., Disp: , Rfl:     Review of Systems   Constitutional: Positive for fatigue. Negative for chills.   HENT: Negative for congestion.    Eyes: Negative for visual disturbance.   Respiratory: Positive for cough and shortness of breath.    Cardiovascular: Negative for chest pain.   Gastrointestinal: Negative for diarrhea, nausea and vomiting.   Genitourinary: Negative for difficulty  "urinating.   Musculoskeletal: Negative for arthralgias.   Skin: Negative for rash.   Neurological: Negative for dizziness and speech difficulty.   Hematological: Negative for adenopathy.   Psychiatric/Behavioral: The patient is not nervous/anxious.      ------------------------------------  Objective   /90   Pulse 60   Ht 170.2 cm (67\")   Wt 69.6 kg (153 lb 6.4 oz)   SpO2 94% Comment: RA  Breastfeeding? No   BMI 24.03 kg/m²   Physical Exam   Constitutional: She is oriented to person, place, and time. She appears well-developed and well-nourished.   HENT:   Head: Normocephalic and atraumatic.   Eyes: EOM are normal. Pupils are equal, round, and reactive to light.   Neck: Normal range of motion. Neck supple.   Cardiovascular: Normal rate, regular rhythm and normal heart sounds.   Pulmonary/Chest: Effort normal.   Breath sounds are diminished.   Abdominal: Soft.   Musculoskeletal: Normal range of motion.   Neurological: She is alert and oriented to person, place, and time.   Skin: Skin is warm and dry.   Psychiatric: She has a normal mood and affect.   Nursing note and vitals reviewed.          Pulmonary Functions Testing Results:  FEV1   Date Value Ref Range Status   06/24/2019 28% liters Final     FVC   Date Value Ref Range Status   06/24/2019 57% liters Final     FEV1/FVC   Date Value Ref Range Status   06/24/2019 38.03% % Final      My interpretation of PFT:  1.  Spirometry is consistent with a very severe obstructive ventilatory defect.  2.  When current studies are compared to studies from the last year she has had slight improvement in her FVC compared to previous but her FEV1 has dropped somewhat compared to previous.  However her current FEV1 is basically unchanged from FEV1 done in 2011 her FVC is almost identical to that FVC as well so that indicates she has had reasonable stability over a period of 8 years although there is been some fluctuation her pulmonary functions probably related to her " previous tobacco use and also due to environmental factors.  Assessment/Plan   Robbin was seen today for shortness of breath and cough.    Diagnoses and all orders for this visit:    COPD, very severe (CMS/HCC)  -     Pulmonary Function Test    Personal history of nicotine dependence    Other orders  -     Discontinue: tiotropium bromide monohydrate (SPIRIVA RESPIMAT) 2.5 MCG/ACT aerosol solution inhaler; Inhale 2 puffs Daily. Use in place of your Atrovent HFA inhaler.  -     tiotropium bromide monohydrate (SPIRIVA RESPIMAT) 2.5 MCG/ACT aerosol solution inhaler; Inhale 2 puffs Daily.      Patient's Body mass index is 24.03 kg/m². BMI is within normal parameters. No follow-up required..      Again, I am going to trial the Spiriva Respimat in place of her Atrovent and she is given samples and the Respimat device.  Otherwise I will see her back in 1 year with a flow volume loop on return.  I did discuss lung cancer screening with the patient and she declines.

## 2019-06-24 NOTE — PATIENT INSTRUCTIONS
I did advise the patient that her flow volume loop showed slight improvement in her FVC and a slight drop in her FEV1 from previous.  Actually over they are very stable compared to studies that were done in 2011.  I did discuss lung cancer screening with her and she is not interested.  She is having to use her home nebulizer bit more frequently and I told her we can try on the Spiriva Respimat in place of her Atrovent HFA to see if this is more effective in controlling her symptoms throughout the day and samples were provided and she was instructed on the use the Respimat device.  I told her if she likes this better and Atrovent contact the office will get her prescription if not she may go back to Atrovent.  I advised her she may continue her Xopenex neb treatments as needed even while on the Spiriva Respimat.

## 2019-06-25 PROBLEM — J44.9 COPD, VERY SEVERE (HCC): Status: ACTIVE | Noted: 2019-06-25

## 2019-06-25 PROBLEM — Z87.891 PERSONAL HISTORY OF NICOTINE DEPENDENCE: Status: ACTIVE | Noted: 2019-06-25

## 2019-09-09 DIAGNOSIS — J44.9 COPD, VERY SEVERE (HCC): Primary | ICD-10-CM

## 2019-09-10 RX ORDER — LEVALBUTEROL INHALATION SOLUTION 1.25 MG/3ML
SOLUTION RESPIRATORY (INHALATION)
Qty: 270 ML | Refills: 11 | Status: SHIPPED | OUTPATIENT
Start: 2019-09-10

## 2020-01-14 ENCOUNTER — TELEPHONE (OUTPATIENT)
Dept: PULMONOLOGY | Facility: CLINIC | Age: 75
End: 2020-01-14

## 2020-01-14 NOTE — TELEPHONE ENCOUNTER
Generic albuterol 2.5 mcg would be the cheapest although there may be more risk of side effects such as tremulousness.  If she would like to have it switched that is fine and we can put in a new prescription.

## 2020-01-15 DIAGNOSIS — J44.9 COPD, VERY SEVERE (HCC): Primary | ICD-10-CM

## 2020-01-15 RX ORDER — ALBUTEROL SULFATE 2.5 MG/3ML
2.5 SOLUTION RESPIRATORY (INHALATION) 4 TIMES DAILY PRN
Qty: 375 VIAL | Refills: 3 | Status: SHIPPED | OUTPATIENT
Start: 2020-01-15

## 2020-01-15 NOTE — TELEPHONE ENCOUNTER
Please send a script for 90-day supply to [facility] in [place]. I did tell her that if her deductible wasn't met then no matter what we call in it is going to be high.   I told her to call her insurance to see how much her deductible is and if she has met it.  She didn't want to do that, she is wanting me to send in a script for albuterol for 90-day supply so she can call them tomorrow to price the medicine.   Please send it in to the pharmacy.

## 2020-06-24 ENCOUNTER — OFFICE VISIT (OUTPATIENT)
Dept: PULMONOLOGY | Facility: CLINIC | Age: 75
End: 2020-06-24

## 2020-06-24 VITALS
DIASTOLIC BLOOD PRESSURE: 80 MMHG | OXYGEN SATURATION: 87 % | BODY MASS INDEX: 23.52 KG/M2 | HEART RATE: 64 BPM | SYSTOLIC BLOOD PRESSURE: 132 MMHG | TEMPERATURE: 98.1 F | WEIGHT: 150.2 LBS

## 2020-06-24 DIAGNOSIS — G47.34 NOCTURNAL HYPOXIA: ICD-10-CM

## 2020-06-24 DIAGNOSIS — Z87.891 PERSONAL HISTORY OF NICOTINE DEPENDENCE: ICD-10-CM

## 2020-06-24 DIAGNOSIS — R09.02 EXERCISE HYPOXEMIA: ICD-10-CM

## 2020-06-24 DIAGNOSIS — J44.9 COPD, VERY SEVERE (HCC): Primary | ICD-10-CM

## 2020-06-24 PROCEDURE — 99214 OFFICE O/P EST MOD 30 MIN: CPT | Performed by: INTERNAL MEDICINE

## 2020-06-24 NOTE — PATIENT INSTRUCTIONS
The patient still uses her oxygen at night.  She rarely if ever use it during the day but I encouraged her to still keep her portable unit just in case she needs oxygen to take with exertion if she develops significant shortness of breath.  Otherwise she wants to hold off any consideration of lung cancer screening.  She has been able to stay off cigarettes.  I will see her back in follow-up in 1 year.

## 2020-06-24 NOTE — PROGRESS NOTES
Subjective   Robbin Garcia is a 75 y.o. female.     Chief Complaint   Patient presents with   • COPD      No My Sticky Note on file.    History of Present Illness   This visit was done as a face-to-face visit with both the patient myself wearing a mask with appropriate hand hygiene measures being observed.  The patient has been able to stay off cigarettes.  She states she still has cigarettes at home and sometimes will chew on them but does not smoke them.  She has oxygen at home and wears it almost always while sleeping but only very intermittently during the day.  She does states she seems to be a bit more short of breath when she is at home as opposed to when she is out.  I told her she may want to have her home check for anything such as mold or other potential airway irritants.  She does have a pet but this is just a small dog.  She does states she is dyspneic when she does housework I encouraged to wear her oxygen when she does so but she states she really does not does seem to help her that much when she does.  This probably just relates to the severity of her COPD.  Her O2 sat was slightly low today and I encouraged her to try to wear oxygen routinely with exertion as well as at night but I doubt if she will do so, at least in terms of wearing it with exertion..    Medical/Family/Social History   has a past medical history of Anxiety, Arthritis, Asthma, COPD (chronic obstructive pulmonary disease) (CMS/HCC), Disease of thyroid gland, Ear infection, Hypertension, Hypoxia, IBS (irritable bowel syndrome), Sleep apnea, Vitamin D deficiency, and Weakness of hand.   has a past surgical history that includes Colonoscopy; Rectal prolapse repair; Hysterectomy; Dilation and curettage of uterus; Other surgical history; and Ulnar nerve transposition (Left, 4/19/2017).  family history is not on file.   reports that she has quit smoking. Her smoking use included cigarettes. She has a 50.00 pack-year smoking history. She  has never used smokeless tobacco. She reports that she does not drink alcohol or use drugs.  Allergies   Allergen Reactions   • Nitrous Oxide Shortness Of Breath     Pt states she quit breathing   • Mold Extract [Trichophyton] Rash   • Codeine Other (See Comments)     Double vision     Medications    Current Outpatient Medications:   •  albuterol (PROVENTIL) (2.5 MG/3ML) 0.083% nebulizer solution, Take 2.5 mg by nebulization 4 (Four) Times a Day As Needed for Wheezing., Disp: 375 vial, Rfl: 3  •  ATROVENT HFA 17 MCG/ACT inhaler, TAKE 2 PUFFS BY MOUTH 4 TIMES A DAY, Disp: 1 inhaler, Rfl: 3  •  cholecalciferol (VITAMIN D3) 1000 UNITS tablet, Take 1,000 Units by mouth Daily., Disp: , Rfl:   •  clorazepate (TRANXENE) 7.5 MG tablet, Take 7.5 mg by mouth 2 (Two) Times a Day As Needed for Anxiety., Disp: , Rfl:   •  diltiaZEM CD (CARDIZEM CD) 240 MG 24 hr capsule, , Disp: , Rfl:   •  levalbuterol (XOPENEX) 1.25 MG/3ML nebulizer solution, INHALE 1 VIAL VIA NEBULIZER EVERY 8 HOURS, Disp: 270 mL, Rfl: 11  •  levocetirizine (XYZAL) 5 MG tablet, Take 5 mg by mouth Every Evening., Disp: , Rfl:   •  levothyroxine (SYNTHROID, LEVOTHROID) 100 MCG tablet, Take 100 mcg by mouth Daily., Disp: , Rfl:   •  lisinopril (PRINIVIL,ZESTRIL) 5 MG tablet, Take 5 mg by mouth Daily., Disp: , Rfl:   •  montelukast (SINGULAIR) 10 MG tablet, Take 10 mg by mouth Every Night., Disp: , Rfl:   •  O2 (OXYGEN), Inhale 2 L/min Daily., Disp: , Rfl:   •  verapamil SR (CALAN-SR) 240 MG CR tablet, Take 240 mg by mouth Every Night., Disp: , Rfl:   •  azelastine (ASTELIN) 0.1 % nasal spray, 2 sprays into each nostril 2 (Two) Times a Day As Needed for Rhinitis or Allergies. Use in each nostril as directed, Disp: , Rfl:   •  ipratropium (ATROVENT HFA) 17 MCG/ACT inhaler, Inhale 2 puffs 4 (Four) Times a Day., Disp: 3 inhaler, Rfl: 3  •  neomycin-polymyxin-hydrocortisone (CORTISPORIN) 3.5-11405-0 otic solution, Administer 3 drops into both ears 4 (Four) Times a  Day., Disp: , Rfl:   •  tiotropium bromide monohydrate (SPIRIVA RESPIMAT) 2.5 MCG/ACT aerosol solution inhaler, Inhale 2 puffs Daily., Disp: 2 inhaler, Rfl: 0    Review of Systems   Constitutional: Positive for fatigue. Negative for chills and fever.   HENT: Negative for congestion.    Eyes: Negative for visual disturbance.   Respiratory: Positive for shortness of breath.         She does have shortness of breath with exertion.   Cardiovascular: Negative for chest pain.   Gastrointestinal: Negative for diarrhea, nausea and vomiting.   Genitourinary: Negative for difficulty urinating.   Musculoskeletal: Negative for arthralgias.   Skin: Negative for rash.   Neurological: Negative for dizziness and speech difficulty.   Psychiatric/Behavioral: Positive for sleep disturbance. The patient is not nervous/anxious.      ------------------------------------  Objective   /80   Pulse 64   Temp 98.1 °F (36.7 °C)   Wt 68.1 kg (150 lb 3.2 oz)   SpO2 (!) 87% Comment: RA  BMI 23.52 kg/m²   Physical Exam   Constitutional: She is oriented to person, place, and time. She appears well-developed and well-nourished.   HENT:   Head: Normocephalic and atraumatic.   She is wearing a mask.   Eyes: Pupils are equal, round, and reactive to light. EOM are normal.   Neck: Normal range of motion. Neck supple.   Cardiovascular: Normal rate, regular rhythm and normal heart sounds.   Pulmonary/Chest: Effort normal.   Breath sounds are diminished throughout.   Abdominal: Soft.   Musculoskeletal: Normal range of motion. She exhibits deformity.   She has some arthritic change of the fingers.   Neurological: She is alert and oriented to person, place, and time.   Skin: Skin is warm and dry.   Psychiatric: She has a normal mood and affect.   Nursing note and vitals reviewed.              Pulmonary Functions Testing Results:  PFT Values        Some values may be hidden. Unless noted otherwise, only the newest values recorded on each date are  displayed.         Old Values PFT Results 6/24/19   FVC 57%   FEV1 28%   FEV1/FVC 38.03%      Pre Drug PFT Results 6/24/19   No data to display.      Post Drug PFT Results 6/24/19   No data to display.      Other Tests PFT Results 6/24/19   No data to display.                 Assessment/Plan   Robbin was seen today for copd.    Diagnoses and all orders for this visit:    COPD, very severe (CMS/HCC)    Exercise hypoxemia    Nocturnal hypoxia    Personal history of nicotine dependence      Patient's Body mass index is 23.52 kg/m². BMI is within normal parameters. No follow-up required..      She again does have both exercise hypoxemia manifested by decreased O2 sat if she walked into the exam room, and nocturnal hypoxemia.  I encouraged wear oxygen at night at least when she exercises during the day.  She declines a screening chest CT.  I will see her back in 1 year.  I did advise to check and see if there is any air irritants in her home such as mold that might contribute to her increased symptoms when she is at home.

## 2020-10-01 ENCOUNTER — OUTSIDE FACILITY SERVICE (OUTPATIENT)
Dept: CARDIOLOGY | Facility: CLINIC | Age: 75
End: 2020-10-01

## 2020-10-01 PROCEDURE — 93010 ELECTROCARDIOGRAM REPORT: CPT | Performed by: INTERNAL MEDICINE

## 2020-11-04 ENCOUNTER — TELEPHONE (OUTPATIENT)
Dept: PULMONOLOGY | Facility: CLINIC | Age: 75
End: 2020-11-04

## 2020-11-04 NOTE — TELEPHONE ENCOUNTER
Patient called stating that Dr Norman had put her on Atrovent in the past. She was in the hospital a few weeks ago and was given Symbicort and wanting refills to be sent to [facility] in [place]

## 2020-11-05 RX ORDER — BUDESONIDE AND FORMOTEROL FUMARATE DIHYDRATE 160; 4.5 UG/1; UG/1
2 AEROSOL RESPIRATORY (INHALATION)
Qty: 1 INHALER | Refills: 11 | Status: SHIPPED | OUTPATIENT
Start: 2020-11-05 | End: 2020-12-05

## 2020-11-05 NOTE — TELEPHONE ENCOUNTER
Prescription for Symbicort sent as requested. However, I am not sure her insurance will cover this.

## 2024-08-24 ENCOUNTER — APPOINTMENT (OUTPATIENT)
Dept: CT IMAGING | Facility: HOSPITAL | Age: 79
End: 2024-08-24
Payer: MEDICARE

## 2024-08-24 ENCOUNTER — APPOINTMENT (OUTPATIENT)
Dept: GENERAL RADIOLOGY | Facility: HOSPITAL | Age: 79
End: 2024-08-24
Payer: MEDICARE

## 2024-08-24 ENCOUNTER — HOSPITAL ENCOUNTER (EMERGENCY)
Facility: HOSPITAL | Age: 79
Discharge: HOME | End: 2024-08-25
Payer: MEDICARE

## 2024-08-24 ENCOUNTER — HOSPITAL ENCOUNTER (EMERGENCY)
Facility: HOSPITAL | Age: 79
Discharge: HOME | End: 2024-08-24
Attending: INTERNAL MEDICINE
Payer: MEDICARE

## 2024-08-24 VITALS
SYSTOLIC BLOOD PRESSURE: 138 MMHG | RESPIRATION RATE: 20 BRPM | DIASTOLIC BLOOD PRESSURE: 67 MMHG | OXYGEN SATURATION: 98 % | WEIGHT: 148 LBS | TEMPERATURE: 98.1 F | BODY MASS INDEX: 22.43 KG/M2 | HEIGHT: 68 IN | HEART RATE: 70 BPM

## 2024-08-24 DIAGNOSIS — N28.1 RENAL CYST: ICD-10-CM

## 2024-08-24 DIAGNOSIS — S00.83XA CONTUSION OF FACE, INITIAL ENCOUNTER: Primary | ICD-10-CM

## 2024-08-24 DIAGNOSIS — K80.21 CALCULUS OF GALLBLADDER WITH BILIARY OBSTRUCTION BUT WITHOUT CHOLECYSTITIS: ICD-10-CM

## 2024-08-24 DIAGNOSIS — H11.31 SUBCONJUNCTIVAL HEMORRHAGE OF RIGHT EYE: ICD-10-CM
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
## 2024-08-24 DIAGNOSIS — S20.219A CONTUSION OF CHEST WALL, UNSPECIFIED LATERALITY, INITIAL ENCOUNTER: ICD-10-CM

## 2024-08-24 DIAGNOSIS — K62.3 RECTAL PROLAPSE: Primary | ICD-10-CM

## 2024-08-24 LAB
ALBUMIN SERPL-MCNC: 4.1 G/DL (ref 3.5–5.2)
ALBUMIN SERPL-MCNC: 4.2 G/DL (ref 3.5–5.2)
ALBUMIN/GLOB SERPL: 1.5 G/DL
ALBUMIN/GLOB SERPL: 1.5 G/DL
ALP SERPL-CCNC: 85 U/L (ref 39–117)
ALP SERPL-CCNC: 87 U/L (ref 39–117)
ALT SERPL W P-5'-P-CCNC: 18 U/L (ref 1–33)
ALT SERPL W P-5'-P-CCNC: 19 U/L (ref 1–33)
ANION GAP SERPL CALCULATED.3IONS-SCNC: 7 MMOL/L (ref 5–15)
ANION GAP SERPL CALCULATED.3IONS-SCNC: 8 MMOL/L (ref 5–15)
AST SERPL-CCNC: 23 U/L (ref 1–32)
AST SERPL-CCNC: 24 U/L (ref 1–32)
BASOPHILS # BLD AUTO: 0.05 10*3/MM3 (ref 0–0.2)
BASOPHILS # BLD AUTO: 0.06 10*3/MM3 (ref 0–0.2)
BASOPHILS NFR BLD AUTO: 0.7 % (ref 0–1.5)
BASOPHILS NFR BLD AUTO: 0.8 % (ref 0–1.5)
BILIRUB SERPL-MCNC: 0.2 MG/DL (ref 0–1.2)
BILIRUB SERPL-MCNC: 0.3 MG/DL (ref 0–1.2)
BILIRUB UR QL STRIP: NEGATIVE
BUN SERPL-MCNC: 19 MG/DL (ref 8–23)
BUN SERPL-MCNC: 24 MG/DL (ref 8–23)
BUN/CREAT SERPL: 26 (ref 7–25)
BUN/CREAT SERPL: 31.6 (ref 7–25)
CALCIUM SPEC-SCNC: 9.6 MG/DL (ref 8.6–10.5)
CALCIUM SPEC-SCNC: 9.8 MG/DL (ref 8.6–10.5)
CHLORIDE SERPL-SCNC: 102 MMOL/L (ref 98–107)
CHLORIDE SERPL-SCNC: 103 MMOL/L (ref 98–107)
CLARITY UR: CLEAR
CO2 SERPL-SCNC: 31 MMOL/L (ref 22–29)
CO2 SERPL-SCNC: 32 MMOL/L (ref 22–29)
COLOR UR: YELLOW
CREAT SERPL-MCNC: 0.73 MG/DL (ref 0.57–1)
CREAT SERPL-MCNC: 0.76 MG/DL (ref 0.57–1)
DEPRECATED RDW RBC AUTO: 44.6 FL (ref 37–54)
DEPRECATED RDW RBC AUTO: 44.8 FL (ref 37–54)
EGFRCR SERPLBLD CKD-EPI 2021: 79.8 ML/MIN/1.73
EGFRCR SERPLBLD CKD-EPI 2021: 83.8 ML/MIN/1.73
EOSINOPHIL # BLD AUTO: 0.17 10*3/MM3 (ref 0–0.4)
EOSINOPHIL # BLD AUTO: 0.35 10*3/MM3 (ref 0–0.4)
EOSINOPHIL NFR BLD AUTO: 2.2 % (ref 0.3–6.2)
EOSINOPHIL NFR BLD AUTO: 5 % (ref 0.3–6.2)
ERYTHROCYTE [DISTWIDTH] IN BLOOD BY AUTOMATED COUNT: 12.3 % (ref 12.3–15.4)
ERYTHROCYTE [DISTWIDTH] IN BLOOD BY AUTOMATED COUNT: 12.4 % (ref 12.3–15.4)
GLOBULIN UR ELPH-MCNC: 2.8 GM/DL
GLOBULIN UR ELPH-MCNC: 2.8 GM/DL
GLUCOSE SERPL-MCNC: 124 MG/DL (ref 65–99)
GLUCOSE SERPL-MCNC: 145 MG/DL (ref 65–99)
GLUCOSE UR STRIP-MCNC: NEGATIVE MG/DL
HCT VFR BLD AUTO: 36.6 % (ref 34–46.6)
HCT VFR BLD AUTO: 38.3 % (ref 34–46.6)
HGB BLD-MCNC: 11.4 G/DL (ref 12–15.9)
HGB BLD-MCNC: 11.8 G/DL (ref 12–15.9)
HGB UR QL STRIP.AUTO: NEGATIVE
IMM GRANULOCYTES # BLD AUTO: 0.01 10*3/MM3 (ref 0–0.05)
IMM GRANULOCYTES # BLD AUTO: 0.04 10*3/MM3 (ref 0–0.05)
IMM GRANULOCYTES NFR BLD AUTO: 0.1 % (ref 0–0.5)
IMM GRANULOCYTES NFR BLD AUTO: 0.5 % (ref 0–0.5)
INR PPP: 0.9 (ref 0.91–1.09)
KETONES UR QL STRIP: NEGATIVE
LEUKOCYTE ESTERASE UR QL STRIP.AUTO: NEGATIVE
LIPASE SERPL-CCNC: 20 U/L (ref 13–60)
LYMPHOCYTES # BLD AUTO: 1.21 10*3/MM3 (ref 0.7–3.1)
LYMPHOCYTES # BLD AUTO: 1.5 10*3/MM3 (ref 0.7–3.1)
LYMPHOCYTES NFR BLD AUTO: 16 % (ref 19.6–45.3)
LYMPHOCYTES NFR BLD AUTO: 21.2 % (ref 19.6–45.3)
MCH RBC QN AUTO: 30.5 PG (ref 26.6–33)
MCH RBC QN AUTO: 30.6 PG (ref 26.6–33)
MCHC RBC AUTO-ENTMCNC: 30.8 G/DL (ref 31.5–35.7)
MCHC RBC AUTO-ENTMCNC: 31.1 G/DL (ref 31.5–35.7)
MCV RBC AUTO: 98.4 FL (ref 79–97)
MCV RBC AUTO: 99 FL (ref 79–97)
MONOCYTES # BLD AUTO: 0.61 10*3/MM3 (ref 0.1–0.9)
MONOCYTES # BLD AUTO: 0.72 10*3/MM3 (ref 0.1–0.9)
MONOCYTES NFR BLD AUTO: 10.2 % (ref 5–12)
MONOCYTES NFR BLD AUTO: 8.1 % (ref 5–12)
NEUTROPHILS NFR BLD AUTO: 4.42 10*3/MM3 (ref 1.7–7)
NEUTROPHILS NFR BLD AUTO: 5.49 10*3/MM3 (ref 1.7–7)
NEUTROPHILS NFR BLD AUTO: 62.7 % (ref 42.7–76)
NEUTROPHILS NFR BLD AUTO: 72.5 % (ref 42.7–76)
NITRITE UR QL STRIP: NEGATIVE
NRBC BLD AUTO-RTO: 0 /100 WBC (ref 0–0.2)
NRBC BLD AUTO-RTO: 0 /100 WBC (ref 0–0.2)
PH UR STRIP.AUTO: 7 [PH] (ref 5–8)
PLATELET # BLD AUTO: 181 10*3/MM3 (ref 140–450)
PLATELET # BLD AUTO: 191 10*3/MM3 (ref 140–450)
PMV BLD AUTO: 10.9 FL (ref 6–12)
PMV BLD AUTO: 10.9 FL (ref 6–12)
POTASSIUM SERPL-SCNC: 3.8 MMOL/L (ref 3.5–5.2)
POTASSIUM SERPL-SCNC: 3.9 MMOL/L (ref 3.5–5.2)
PROT SERPL-MCNC: 6.9 G/DL (ref 6–8.5)
PROT SERPL-MCNC: 7 G/DL (ref 6–8.5)
PROT UR QL STRIP: NEGATIVE
PROTHROMBIN TIME: 12.6 SECONDS (ref 11.8–14.8)
RBC # BLD AUTO: 3.72 10*6/MM3 (ref 3.77–5.28)
RBC # BLD AUTO: 3.87 10*6/MM3 (ref 3.77–5.28)
SODIUM SERPL-SCNC: 141 MMOL/L (ref 136–145)
SODIUM SERPL-SCNC: 142 MMOL/L (ref 136–145)
SP GR UR STRIP: 1.02 (ref 1–1.03)
TROPONIN T SERPL HS-MCNC: 22 NG/L
UROBILINOGEN UR QL STRIP: NORMAL
WBC NRBC COR # BLD AUTO: 7.06 10*3/MM3 (ref 3.4–10.8)
WBC NRBC COR # BLD AUTO: 7.57 10*3/MM3 (ref 3.4–10.8)

## 2024-08-24 PROCEDURE — 90471 IMMUNIZATION ADMIN: CPT | Performed by: NURSE PRACTITIONER

## 2024-08-24 PROCEDURE — 73130 X-RAY EXAM OF HAND: CPT

## 2024-08-24 PROCEDURE — 71045 X-RAY EXAM CHEST 1 VIEW: CPT

## 2024-08-24 PROCEDURE — 85025 COMPLETE CBC W/AUTO DIFF WBC: CPT | Performed by: NURSE PRACTITIONER

## 2024-08-24 PROCEDURE — 81003 URINALYSIS AUTO W/O SCOPE: CPT | Performed by: INTERNAL MEDICINE

## 2024-08-24 PROCEDURE — 84484 ASSAY OF TROPONIN QUANT: CPT | Performed by: NURSE PRACTITIONER

## 2024-08-24 PROCEDURE — 74177 CT ABD & PELVIS W/CONTRAST: CPT

## 2024-08-24 PROCEDURE — 25010000002 TETANUS-DIPHTH-ACELL PERTUSSIS 5-2.5-18.5 LF-MCG/0.5 SUSPENSION PREFILLED SYRINGE: Performed by: NURSE PRACTITIONER

## 2024-08-24 PROCEDURE — 85610 PROTHROMBIN TIME: CPT | Performed by: NURSE PRACTITIONER

## 2024-08-24 PROCEDURE — 90715 TDAP VACCINE 7 YRS/> IM: CPT | Performed by: NURSE PRACTITIONER

## 2024-08-24 PROCEDURE — 99285 EMERGENCY DEPT VISIT HI MDM: CPT

## 2024-08-24 PROCEDURE — 72125 CT NECK SPINE W/O DYE: CPT

## 2024-08-24 PROCEDURE — 70486 CT MAXILLOFACIAL W/O DYE: CPT

## 2024-08-24 PROCEDURE — 71260 CT THORAX DX C+: CPT

## 2024-08-24 PROCEDURE — 93010 ELECTROCARDIOGRAM REPORT: CPT | Performed by: INTERNAL MEDICINE

## 2024-08-24 PROCEDURE — 25510000001 IOPAMIDOL 61 % SOLUTION: Performed by: INTERNAL MEDICINE

## 2024-08-24 PROCEDURE — 70450 CT HEAD/BRAIN W/O DYE: CPT

## 2024-08-24 PROCEDURE — 72131 CT LUMBAR SPINE W/O DYE: CPT

## 2024-08-24 PROCEDURE — 83690 ASSAY OF LIPASE: CPT | Performed by: INTERNAL MEDICINE

## 2024-08-24 PROCEDURE — 93005 ELECTROCARDIOGRAM TRACING: CPT | Performed by: NURSE PRACTITIONER

## 2024-08-24 PROCEDURE — 85025 COMPLETE CBC W/AUTO DIFF WBC: CPT | Performed by: INTERNAL MEDICINE

## 2024-08-24 PROCEDURE — 80053 COMPREHEN METABOLIC PANEL: CPT | Performed by: NURSE PRACTITIONER

## 2024-08-24 PROCEDURE — 80053 COMPREHEN METABOLIC PANEL: CPT | Performed by: INTERNAL MEDICINE

## 2024-08-24 RX ORDER — IOPAMIDOL 612 MG/ML
100 INJECTION, SOLUTION INTRAVASCULAR
Status: COMPLETED | OUTPATIENT
Start: 2024-08-24 | End: 2024-08-24

## 2024-08-24 RX ORDER — SODIUM CHLORIDE 0.9 % (FLUSH) 0.9 %
10 SYRINGE (ML) INJECTION AS NEEDED
Status: DISCONTINUED | OUTPATIENT
Start: 2024-08-24 | End: 2024-08-25 | Stop reason: HOSPADM

## 2024-08-24 RX ORDER — TETRACAINE HYDROCHLORIDE 5 MG/ML
2 SOLUTION OPHTHALMIC ONCE
Status: COMPLETED | OUTPATIENT
Start: 2024-08-24 | End: 2024-08-24

## 2024-08-24 RX ADMIN — FLUORESCEIN SODIUM 1 STRIP: 1 STRIP OPHTHALMIC at 23:15

## 2024-08-24 RX ADMIN — TETRACAINE HYDROCHLORIDE 2 DROP: 5 SOLUTION OPHTHALMIC at 23:14

## 2024-08-24 RX ADMIN — TETANUS TOXOID, REDUCED DIPHTHERIA TOXOID AND ACELLULAR PERTUSSIS VACCINE, ADSORBED 0.5 ML: 5; 2.5; 8; 8; 2.5 SUSPENSION INTRAMUSCULAR at 23:15

## 2024-08-24 RX ADMIN — IOPAMIDOL 100 ML: 612 INJECTION, SOLUTION INTRAVENOUS at 18:50

## 2024-08-24 NOTE — ED PROVIDER NOTES
"Subjective   History of Present Illness  79-year-old female who presents emergency department with rectal pain.  The patient tells me that she has a chronic rectal prolapse.  She has noted rectal prolapse in the past.  She has had 2 surgeries for this one about 15 years ago and one 4 years ago.  She states \"that colon surgery.\"  She notes that she does not have any sphincter tone and says that her doctor in the past told her that her muscle was gone.  Her daughter describes it as having some of the colon removed in order to keep the rectum in place.  She had this performed [facility].  She complains of some mild abdominal distention.  On palpation she has right lower quadrant pain.  She denies any nausea or vomiting.  She has had a worsening stool today, but then tells me not much of a stool today.  She did take medication to stop her bowel movements.  She states she chronically has something hanging down, and was concerned that on my exam I did not see her rectal prolapse.  The patient states last week she went to [facility] ED for rectal prolapse.  She notes that they put sugar on it and this improved.  She was advised to come to the ED at [facility].  I discussed with her the need we did not have a colorectal surgeon in house.  Patient states she has to use a depends for urinary incontinence.  She notes that her stool has been slimy and had some blood in it.    Review of Systems   Gastrointestinal:         Rectal prolapse       Past Medical History:   Diagnosis Date    Anxiety     Arthritis     Asthma     COPD (chronic obstructive pulmonary disease)     Disease of thyroid gland     Ear infection     Hypertension     Hypoxia     IBS (irritable bowel syndrome)     Sleep apnea     Vitamin D deficiency     Weakness of hand     and fingers       Allergies   Allergen Reactions    Nitrous Oxide Shortness Of Breath     Pt states she quit breathing    Mold Extract [Trichophyton] Rash    Codeine Other (See Comments)     " Double vision       Past Surgical History:   Procedure Laterality Date    COLONOSCOPY      DILATATION AND CURETTAGE      HYSTERECTOMY      OTHER SURGICAL HISTORY      pt states she quit breathing with anesthesia in past    RECTAL PROLAPSE REPAIR      ULNAR NERVE TRANSPOSITION Left 4/19/2017    Procedure: ULNAR NERVE TRANSPOSITION, LEFT;  Surgeon: Narendra Timmons MD;  Location: [facility] OR;  Service:        History reviewed. No pertinent family history.    Social History     Socioeconomic History    Marital status:    Tobacco Use    Smoking status: Former     Current packs/day: 1.00     Average packs/day: 1 pack/day for 50.0 years (50.0 ttl pk-yrs)     Types: Cigarettes    Smokeless tobacco: Never   Substance and Sexual Activity    Alcohol use: No     Comment: rare    Drug use: No    Sexual activity: Defer           Objective   Physical Exam  Vitals reviewed.   Constitutional:       Appearance: Normal appearance. She is not ill-appearing.   HENT:      Head: Normocephalic and atraumatic.      Right Ear: External ear normal.      Left Ear: External ear normal.      Nose: Nose normal.   Eyes:      Conjunctiva/sclera: Conjunctivae normal.   Cardiovascular:      Rate and Rhythm: Normal rate and regular rhythm.      Heart sounds: Normal heart sounds.   Pulmonary:      Effort: Pulmonary effort is normal.      Breath sounds: Normal breath sounds.   Abdominal:      Palpations: Abdomen is soft.      Tenderness: There is abdominal tenderness.      Comments: Right lower quadrant pain   Genitourinary:     Comments: I do not see a rectal prolapse on rectal exam.  She has a eraser sized pink area that is on the 9 o'clock position of the rectum.  I do not see any active or gross bleeding.  Musculoskeletal:         General: No swelling, tenderness or deformity.      Cervical back: Normal range of motion and neck supple.   Skin:     General: Skin is warm and dry.   Neurological:      General: No focal deficit present.       Mental Status: She is alert.      Cranial Nerves: No cranial nerve deficit.      Sensory: No sensory deficit.   Psychiatric:         Mood and Affect: Mood normal.      Comments: The patient appears confused, and slow to respond to some questions.  The patient at bedside feels and frequently.         Procedures       Labs Reviewed   COMPREHENSIVE METABOLIC PANEL - Abnormal; Notable for the following components:       Result Value    Glucose 124 (*)     BUN 24 (*)     CO2 32.0 (*)     BUN/Creatinine Ratio 31.6 (*)     All other components within normal limits    Narrative:     GFR Normal >60  Chronic Kidney Disease <60  Kidney Failure <15    The GFR formula is only valid for adults with stable renal function between ages 18 and 70.   CBC WITH AUTO DIFFERENTIAL - Abnormal; Notable for the following components:    RBC 3.72 (*)     Hemoglobin 11.4 (*)     MCV 98.4 (*)     MCHC 31.1 (*)     All other components within normal limits   LIPASE - Normal   URINALYSIS W/ CULTURE IF INDICATED - Normal    Narrative:     In absence of clinical symptoms, the presence of pyuria, bacteria, and/or nitrites on the urinalysis result does not correlate with infection.  Urine microscopic not indicated.   CBC AND DIFFERENTIAL    Narrative:     The following orders were created for panel order CBC & Differential.  Procedure                               Abnormality         Status                     ---------                               -----------         ------                     CBC Auto Differential[[id number]]        Abnormal            Final result                 Please view results for these tests on the individual orders.     CT Abdomen Pelvis With Contrast   Final Result   1. Gallstones with no sign of cholecystitis.   2. Pelvic floor descent with no bowel obstruction or pelvic mass.       This report was signed and finalized on 8/24/2024 7:53 PM by Dr. Jeffrey Garcia MD.                ED Course  ED Course as of 08/24/24 2101    Sat Aug 24, 2024   2059 I spoke with the patient.  I recommended that the patient follow-up with her outpatient colorectal surgeon.  We reviewed her labs and CT scan.  I asked her to return to the emergency department if she had a complete rectal prolapse again.  She voiced understanding.  She will make contact with her surgeon on Monday.  She will be discharged home in stable condition. [AJ]      ED Course User Index  [AJ] Melanie Schaefer DO                                             Medical Decision Making  Differential diagnosis includes GI illness, viral enteritis, rectal prolapse    Problems Addressed:  Rectal prolapse: complicated acute illness or injury    Amount and/or Complexity of Data Reviewed  Labs: ordered.     Details: CBC CMP lipase UA  Radiology: ordered.     Details: CT abdomen pelvis    Risk  Prescription drug management.        Final diagnoses:   Rectal prolapse       ED Disposition  ED Disposition       ED Disposition   Discharge    Condition   Stable    Comment   --               Carola José, DO  [address] [facility] [address]  [phone]    In 2 days           Medication List      No changes were made to your prescriptions during this visit.            Melanie Schaefer DO  08/24/24 1752       Melanie Schaefer DO  08/24/24 4211

## 2024-08-25 VITALS
TEMPERATURE: 98 F | RESPIRATION RATE: 20 BRPM | HEIGHT: 68 IN | SYSTOLIC BLOOD PRESSURE: 150 MMHG | OXYGEN SATURATION: 98 % | DIASTOLIC BLOOD PRESSURE: 74 MMHG | WEIGHT: 147.93 LBS | HEART RATE: 80 BPM | BODY MASS INDEX: 22.42 KG/M2

## 2024-08-25 PROCEDURE — 96374 THER/PROPH/DIAG INJ IV PUSH: CPT

## 2024-08-25 PROCEDURE — 25510000001 IOPAMIDOL 61 % SOLUTION: Performed by: NURSE PRACTITIONER

## 2024-08-25 PROCEDURE — 25010000002 ONDANSETRON PER 1 MG: Performed by: NURSE PRACTITIONER

## 2024-08-25 RX ORDER — ONDANSETRON 2 MG/ML
4 INJECTION INTRAMUSCULAR; INTRAVENOUS ONCE
Status: COMPLETED | OUTPATIENT
Start: 2024-08-25 | End: 2024-08-25

## 2024-08-25 RX ORDER — IOPAMIDOL 612 MG/ML
100 INJECTION, SOLUTION INTRAVASCULAR
Status: COMPLETED | OUTPATIENT
Start: 2024-08-25 | End: 2024-08-25

## 2024-08-25 RX ADMIN — ONDANSETRON 4 MG: 2 INJECTION INTRAMUSCULAR; INTRAVENOUS at 00:16

## 2024-08-25 RX ADMIN — IOPAMIDOL 100 ML: 612 INJECTION, SOLUTION INTRAVENOUS at 00:14

## 2024-08-25 NOTE — DISCHARGE INSTRUCTIONS
Return to ER if symptoms worsen   Ice to areas  Follow up with primary care provider on Monday   Clean abrasions to face with soap and water and apply bacitiraicin

## 2024-08-25 NOTE — DISCHARGE INSTRUCTIONS
Please follow-up with your PCP in 2 days.  I would like for you to call your colorectal surgeon from [place] on Monday.  I believe you need an outpatient follow-up.  Please make sure your bowels continue to move.  Return to the emergency department if your symptoms worsen.
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
## 2024-08-29 LAB
QT INTERVAL: 414 MS
QTC INTERVAL: 456 MS

## 2024-09-04 ENCOUNTER — TELEPHONE (OUTPATIENT)
Dept: MRI IMAGING | Facility: HOSPITAL | Age: 79
End: 2024-09-04
Payer: MEDICARE

## 2024-09-04 NOTE — TELEPHONE ENCOUNTER
I called a left message with answering service for [facility] to go over CT scans while patient was in the ER.

## 2024-09-09 ENCOUNTER — TELEPHONE (OUTPATIENT)
Dept: MRI IMAGING | Facility: HOSPITAL | Age: 79
End: 2024-09-09
Payer: MEDICARE

## 2024-09-09 NOTE — TELEPHONE ENCOUNTER
I spoke with the patient regarding an incidental finding seen on a recent imaging exam.The patient verbalized understanding the Radiologist's recommendations for follow-up. Per the patient request, the report was routed to the PCP.

## (undated) DEVICE — TRY PREP SCRB VAG PVP

## (undated) DEVICE — COTTON UNDERCAST PADDING,REGULAR FINISH: Brand: WEBRIL

## (undated) DEVICE — GLV SURG TRIUMPH MICRO PF LTX 8 STRL

## (undated) DEVICE — SUT PROLN 1 CTX 30IN 8455H

## (undated) DEVICE — BNDG ESMARK 4IN 9FT LF STRL BLU

## (undated) DEVICE — SUT ETHLN 3/0 FS1 30IN 669H

## (undated) DEVICE — PK EXTRM 30

## (undated) DEVICE — DISPOSABLE BIPOLAR CABLE 12FT. (3.6M): Brand: KIRWAN

## (undated) DEVICE — PK TURNOVER RM ADV

## (undated) DEVICE — GLV SURG TRIUMPH MICRO PF LTX 7.5 STRL

## (undated) DEVICE — DRSNG WND GZ CURAD OIL EMULSION 3X3IN STRL

## (undated) DEVICE — BNDG ELAS ECON W/CLIP 4IN 5YD LF STRL

## (undated) DEVICE — LP VESL MAXI RED 2PK

## (undated) DEVICE — ANTIBACTERIAL UNDYED BRAIDED (POLYGLACTIN 910), SYNTHETIC ABSORBABLE SUTURE: Brand: COATED VICRYL

## (undated) DEVICE — 4-PORT MANIFOLD: Brand: NEPTUNE 2

## (undated) DEVICE — Device